# Patient Record
Sex: MALE | Race: WHITE | URBAN - METROPOLITAN AREA
[De-identification: names, ages, dates, MRNs, and addresses within clinical notes are randomized per-mention and may not be internally consistent; named-entity substitution may affect disease eponyms.]

---

## 2019-10-04 ENCOUNTER — OFFICE VISIT (OUTPATIENT)
Dept: PRIMARY CARE CLINIC | Age: 34
End: 2019-10-04
Payer: COMMERCIAL

## 2019-10-04 VITALS
SYSTOLIC BLOOD PRESSURE: 142 MMHG | HEART RATE: 77 BPM | DIASTOLIC BLOOD PRESSURE: 88 MMHG | WEIGHT: 257.8 LBS | HEIGHT: 70 IN | BODY MASS INDEX: 36.91 KG/M2 | OXYGEN SATURATION: 98 %

## 2019-10-04 DIAGNOSIS — R03.0 ELEVATED BP WITHOUT DIAGNOSIS OF HYPERTENSION: ICD-10-CM

## 2019-10-04 DIAGNOSIS — S39.012A STRAIN OF LUMBAR REGION, INITIAL ENCOUNTER: Primary | ICD-10-CM

## 2019-10-04 PROCEDURE — 99203 OFFICE O/P NEW LOW 30 MIN: CPT | Performed by: PHYSICIAN ASSISTANT

## 2019-10-04 RX ORDER — CYCLOBENZAPRINE HCL 10 MG
10 TABLET ORAL 3 TIMES DAILY PRN
Qty: 30 TABLET | Refills: 0 | Status: SHIPPED | OUTPATIENT
Start: 2019-10-04 | End: 2022-08-11

## 2019-10-04 RX ORDER — IBUPROFEN 800 MG/1
800 TABLET ORAL EVERY 8 HOURS PRN
Qty: 60 TABLET | Refills: 0 | Status: SHIPPED | OUTPATIENT
Start: 2019-10-04 | End: 2022-08-11

## 2019-10-04 ASSESSMENT — PATIENT HEALTH QUESTIONNAIRE - PHQ9
2. FEELING DOWN, DEPRESSED OR HOPELESS: 0
SUM OF ALL RESPONSES TO PHQ9 QUESTIONS 1 & 2: 0
SUM OF ALL RESPONSES TO PHQ QUESTIONS 1-9: 0
1. LITTLE INTEREST OR PLEASURE IN DOING THINGS: 0
SUM OF ALL RESPONSES TO PHQ QUESTIONS 1-9: 0

## 2019-10-11 ASSESSMENT — ENCOUNTER SYMPTOMS: BACK PAIN: 1

## 2019-10-15 ENCOUNTER — OFFICE VISIT (OUTPATIENT)
Dept: PRIMARY CARE CLINIC | Age: 34
End: 2019-10-15
Payer: COMMERCIAL

## 2019-10-15 VITALS
SYSTOLIC BLOOD PRESSURE: 136 MMHG | OXYGEN SATURATION: 98 % | DIASTOLIC BLOOD PRESSURE: 84 MMHG | BODY MASS INDEX: 37.93 KG/M2 | WEIGHT: 260.6 LBS | HEART RATE: 95 BPM

## 2019-10-15 DIAGNOSIS — M25.521 RIGHT ELBOW PAIN: ICD-10-CM

## 2019-10-15 DIAGNOSIS — E78.00 HIGH CHOLESTEROL: ICD-10-CM

## 2019-10-15 DIAGNOSIS — Z13.1 SCREENING FOR DIABETES MELLITUS (DM): ICD-10-CM

## 2019-10-15 DIAGNOSIS — M25.621 DECREASED RANGE OF MOTION OF RIGHT ELBOW: ICD-10-CM

## 2019-10-15 DIAGNOSIS — S39.012A STRAIN OF LUMBAR REGION, INITIAL ENCOUNTER: Primary | ICD-10-CM

## 2019-10-15 PROCEDURE — 99214 OFFICE O/P EST MOD 30 MIN: CPT | Performed by: PHYSICIAN ASSISTANT

## 2019-10-20 ASSESSMENT — ENCOUNTER SYMPTOMS: BACK PAIN: 0

## 2021-09-14 ENCOUNTER — OFFICE VISIT (OUTPATIENT)
Dept: PRIMARY CARE CLINIC | Age: 36
End: 2021-09-14
Payer: COMMERCIAL

## 2021-09-14 VITALS
OXYGEN SATURATION: 95 % | SYSTOLIC BLOOD PRESSURE: 138 MMHG | DIASTOLIC BLOOD PRESSURE: 82 MMHG | BODY MASS INDEX: 35.87 KG/M2 | HEART RATE: 96 BPM | WEIGHT: 246.4 LBS

## 2021-09-14 DIAGNOSIS — R51.9 CHRONIC INTRACTABLE HEADACHE, UNSPECIFIED HEADACHE TYPE: Primary | ICD-10-CM

## 2021-09-14 DIAGNOSIS — G89.29 CHRONIC INTRACTABLE HEADACHE, UNSPECIFIED HEADACHE TYPE: Primary | ICD-10-CM

## 2021-09-14 DIAGNOSIS — G47.00 INSOMNIA, UNSPECIFIED TYPE: ICD-10-CM

## 2021-09-14 PROCEDURE — 99214 OFFICE O/P EST MOD 30 MIN: CPT | Performed by: PHYSICIAN ASSISTANT

## 2021-09-14 RX ORDER — SUMATRIPTAN 50 MG/1
50 TABLET, FILM COATED ORAL
Qty: 9 TABLET | Refills: 0 | Status: SHIPPED | OUTPATIENT
Start: 2021-09-14 | End: 2022-08-23

## 2021-09-14 RX ORDER — TRAZODONE HYDROCHLORIDE 50 MG/1
50 TABLET ORAL NIGHTLY
Qty: 30 TABLET | Refills: 0 | Status: SHIPPED | OUTPATIENT
Start: 2021-09-14

## 2021-09-14 SDOH — ECONOMIC STABILITY: FOOD INSECURITY: WITHIN THE PAST 12 MONTHS, YOU WORRIED THAT YOUR FOOD WOULD RUN OUT BEFORE YOU GOT MONEY TO BUY MORE.: NEVER TRUE

## 2021-09-14 SDOH — ECONOMIC STABILITY: FOOD INSECURITY: WITHIN THE PAST 12 MONTHS, THE FOOD YOU BOUGHT JUST DIDN'T LAST AND YOU DIDN'T HAVE MONEY TO GET MORE.: NEVER TRUE

## 2021-09-14 ASSESSMENT — ENCOUNTER SYMPTOMS
RHINORRHEA: 0
CONSTIPATION: 0
VOMITING: 0
PHOTOPHOBIA: 0
DIARRHEA: 0
CHEST TIGHTNESS: 0
SINUS PRESSURE: 0
SHORTNESS OF BREATH: 0
ABDOMINAL PAIN: 0
ABDOMINAL DISTENTION: 0
EYE DISCHARGE: 0
COUGH: 0
SORE THROAT: 0

## 2021-09-14 ASSESSMENT — SOCIAL DETERMINANTS OF HEALTH (SDOH): HOW HARD IS IT FOR YOU TO PAY FOR THE VERY BASICS LIKE FOOD, HOUSING, MEDICAL CARE, AND HEATING?: NOT HARD AT ALL

## 2021-09-14 NOTE — PROGRESS NOTES
for Pain With food 60 tablet 0     No current facility-administered medications for this visit. No Known Allergies    Health Maintenance   Topic Date Due    Hepatitis C screen  Never done    Varicella vaccine (1 of 2 - 2-dose childhood series) Never done    COVID-19 Vaccine (1) Never done    HIV screen  Never done    Flu vaccine (1) Never done    DTaP/Tdap/Td vaccine (2 - Td or Tdap) 05/19/2026    Hepatitis A vaccine  Aged Out    Hepatitis B vaccine  Aged Out    Hib vaccine  Aged Out    Meningococcal (ACWY) vaccine  Aged Out    Pneumococcal 0-64 years Vaccine  Aged Out       Subjective:      Review of Systems   Constitutional: Negative for chills, fever and unexpected weight change. HENT: Negative for congestion, hearing loss, rhinorrhea, sinus pressure and sore throat. Eyes: Negative for photophobia, discharge and visual disturbance. Respiratory: Negative for cough, chest tightness and shortness of breath. Cardiovascular: Negative for chest pain, palpitations and leg swelling. Gastrointestinal: Negative for abdominal distention, abdominal pain, constipation, diarrhea and vomiting. Endocrine: Negative for polydipsia and polyuria. Genitourinary: Negative for decreased urine volume, difficulty urinating, frequency and urgency. Musculoskeletal: Negative for arthralgias, gait problem and myalgias. Skin: Negative for rash. Allergic/Immunologic: Negative for food allergies. Neurological: Positive for headaches. Negative for dizziness, weakness and numbness. Hematological: Negative for adenopathy. Psychiatric/Behavioral: Negative for dysphoric mood and sleep disturbance. The patient is not nervous/anxious. Objective:     /82   Pulse 96   Wt 246 lb 6.4 oz (111.8 kg)   SpO2 95%   BMI 35.87 kg/m²   Physical Exam  Constitutional:       General: He is not in acute distress. Appearance: Normal appearance. He is not ill-appearing.    HENT:      Head: Normocephalic and atraumatic. Right Ear: Tympanic membrane, ear canal and external ear normal.      Left Ear: Tympanic membrane, ear canal and external ear normal.      Nose: Nose normal.      Mouth/Throat:      Mouth: Mucous membranes are moist.   Eyes:      Extraocular Movements: Extraocular movements intact. Conjunctiva/sclera: Conjunctivae normal.      Pupils: Pupils are equal, round, and reactive to light. Neck:      Vascular: No carotid bruit. Cardiovascular:      Rate and Rhythm: Normal rate and regular rhythm. Pulses: Normal pulses. Heart sounds: Normal heart sounds. Pulmonary:      Effort: Pulmonary effort is normal. No respiratory distress. Breath sounds: Normal breath sounds. Abdominal:      General: Bowel sounds are normal. There is no distension. Tenderness: There is no abdominal tenderness. Musculoskeletal:         General: Normal range of motion. Cervical back: Normal range of motion and neck supple. Lymphadenopathy:      Cervical: No cervical adenopathy. Skin:     General: Skin is warm and dry. Neurological:      General: No focal deficit present. Mental Status: He is alert and oriented to person, place, and time. Psychiatric:         Mood and Affect: Mood normal.         Behavior: Behavior normal.         Thought Content: Thought content normal.         Assessment and Plan:          1. Chronic intractable headache, unspecified headache type  -     SUMAtriptan (IMITREX) 50 MG tablet; Take 1 tablet by mouth once as needed for Migraine, Disp-9 tablet, R-0Normal  2. Insomnia, unspecified type  -     traZODone (DESYREL) 50 MG tablet; Take 1 tablet by mouth nightly, Disp-30 tablet, R-0Normal             Patient given educational materials - see patient instructions. Discussed use, benefit, and side effects of prescribed medications. All patient questions answered. Pt voiced understanding. Reviewed health maintenance.   Instructed to continue current medications, diet and exercise. Patient agreed with treatment plan. Follow up as directed.      Electronically signed by GUS Taylor on 9/14/2021 at 2:45 PM

## 2022-08-11 ENCOUNTER — OFFICE VISIT (OUTPATIENT)
Dept: FAMILY MEDICINE CLINIC | Age: 37
End: 2022-08-11
Payer: COMMERCIAL

## 2022-08-11 VITALS
SYSTOLIC BLOOD PRESSURE: 126 MMHG | WEIGHT: 269 LBS | BODY MASS INDEX: 38.51 KG/M2 | OXYGEN SATURATION: 95 % | HEART RATE: 81 BPM | HEIGHT: 70 IN | DIASTOLIC BLOOD PRESSURE: 76 MMHG

## 2022-08-11 DIAGNOSIS — M25.511 ACUTE PAIN OF RIGHT SHOULDER: Primary | ICD-10-CM

## 2022-08-11 PROCEDURE — 99203 OFFICE O/P NEW LOW 30 MIN: CPT | Performed by: STUDENT IN AN ORGANIZED HEALTH CARE EDUCATION/TRAINING PROGRAM

## 2022-08-11 RX ORDER — IBUPROFEN 800 MG/1
800 TABLET ORAL 3 TIMES DAILY PRN
Qty: 30 TABLET | Refills: 1 | Status: SHIPPED | OUTPATIENT
Start: 2022-08-11

## 2022-08-11 RX ORDER — CYCLOBENZAPRINE HCL 10 MG
10 TABLET ORAL 3 TIMES DAILY PRN
Qty: 21 TABLET | Refills: 0 | Status: SHIPPED | OUTPATIENT
Start: 2022-08-11 | End: 2022-08-21

## 2022-08-11 ASSESSMENT — ENCOUNTER SYMPTOMS
ABDOMINAL DISTENTION: 0
SORE THROAT: 0
SHORTNESS OF BREATH: 0
ABDOMINAL PAIN: 0
CHEST TIGHTNESS: 0

## 2022-08-11 ASSESSMENT — PATIENT HEALTH QUESTIONNAIRE - PHQ9
SUM OF ALL RESPONSES TO PHQ QUESTIONS 1-9: 0
2. FEELING DOWN, DEPRESSED OR HOPELESS: 0
1. LITTLE INTEREST OR PLEASURE IN DOING THINGS: 0
SUM OF ALL RESPONSES TO PHQ QUESTIONS 1-9: 0
SUM OF ALL RESPONSES TO PHQ9 QUESTIONS 1 & 2: 0
SUM OF ALL RESPONSES TO PHQ QUESTIONS 1-9: 0
SUM OF ALL RESPONSES TO PHQ QUESTIONS 1-9: 0

## 2022-08-11 NOTE — PROGRESS NOTES
[address]  DR. KANDICE WOLF  [address], [address]  [facility]      Date of Visit:  2022  Patient Name: Raimundo Farrell   Patient :  1985     CHIEF COMPLAINT:     Raimundo Farrell is a 43 y.o. male who presents today for an general visit to be evaluated for the following condition(s):  Chief Complaint   Patient presents with    Establish Care           Neck Pain     Right shoulder/ neck pain. Started Monday night. Better        REVIEW OF SYSTEM      Review of Systems   Constitutional:  Negative for chills and fever. HENT:  Negative for congestion, postnasal drip and sore throat. Respiratory:  Negative for chest tightness and shortness of breath. Cardiovascular:  Negative for chest pain. Gastrointestinal:  Negative for abdominal distention and abdominal pain. Genitourinary:  Negative for difficulty urinating. Musculoskeletal:         Right shoulder pain      HISTORY OF PRESENT ILLNESS     36M here for right shoulder pain. 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 Saw chiropractor yesterday and felt significantly better after adjustment. Woke up at 3am this morning with excrutiating pain. Had to take time off work for the first time. Localized to right shoulder. Intermittent, throbbing. No radiation. No radicular symptoms. No numbness or tingling. Has taken ibuprofen without any relief. His right leg started cramping up. REVIEWED INFORMATION      No Known Allergies    There is no problem list on file for this patient.       Past Medical History:   Diagnosis Date    Chronic infection of both ears        Past Surgical History:   Procedure Laterality Date    APPENDECTOMY      TYMPANOSTOMY TUBE PLACEMENT      WISDOM TOOTH EXTRACTION          Social History     Socioeconomic History    Marital status:      Spouse name: None    Number of children: None    Years of education: None Highest education level: None   Tobacco Use    Smoking status: Never    Smokeless tobacco: Never   Vaping Use    Vaping Use: Never used   Substance and Sexual Activity    Alcohol use: Yes     Alcohol/week: 12.0 standard drinks     Types: 12 Cans of beer per week     Comment: weekly    Drug use: Never    Sexual activity: Yes     Partners: Female     Social Determinants of Health     Financial Resource Strain: Low Risk     Difficulty of Paying Living Expenses: Not hard at all   Food Insecurity: No Food Insecurity    Worried About Running Out of Food in the Last Year: Never true    Ran Out of Food in the Last Year: Never true        Family History   Problem Relation Age of Onset    Heart Disease Father     High Blood Pressure Father     Heart Attack Father 64    Heart Disease Paternal Grandfather     Heart Attack Paternal Grandfather        PHYSICAL EXAM     /76   Pulse 81   Ht 5' 9.5\" (1.765 m)   Wt 269 lb (122 kg)   SpO2 95%   BMI 39.15 kg/m²    Physical Exam  Musculoskeletal:      Comments: Limited abduction and flexion to 90 degrees. Negative empty soda can. Pain with external rotation. Pain along right trapezius to palpation. Limited left lateral bending and left rotation of neck. ASSESSMENT/PLAN     1. Acute pain of right shoulder  Suspect MSK rotator cuff/trap strain. Will start flexeril/ibuprofen for pain relief. PT referral placed. F/u in 1 month, consider soft tissue imaging at that time if not improving. Work restriction note provided  - cyclobenzaprine (FLEXERIL) 10 MG tablet; Take 1 tablet by mouth 3 times daily as needed for Muscle spasms  Dispense: 21 tablet; Refill: 0  - ibuprofen (ADVIL;MOTRIN) 800 MG tablet; Take 1 tablet by mouth 3 times daily as needed for Pain  Dispense: 30 tablet; Refill: 1  - [address]    Return in about 1 month (around 9/11/2022) for right shoulder pain .     COMMUNICATION:       Electronically signed by Tano Pérez MD on 8/11/2022 at 2:50 PM

## 2022-08-12 ENCOUNTER — TELEPHONE (OUTPATIENT)
Dept: FAMILY MEDICINE CLINIC | Age: 37
End: 2022-08-12

## 2022-08-12 NOTE — TELEPHONE ENCOUNTER
Pt dropped off [facility] paperwork that needs filled out and and faxed to employer.  Paperwork is at the  in [facility]

## 2022-08-12 NOTE — TELEPHONE ENCOUNTER
FMLA forms completed: Scanned to chart. No fax number was on paperwork. I did lvm for patient that paperwork was ready to be picked up in chart and to call the office with any questions.

## 2022-08-18 ENCOUNTER — TELEPHONE (OUTPATIENT)
Dept: FAMILY MEDICINE CLINIC | Age: 37
End: 2022-08-18

## 2022-08-18 NOTE — TELEPHONE ENCOUNTER
Patient called stating he needs this note written today and he can come pick it up. Patient states his work is contacting him, and they need the note to excuse him for work tomorrow until Tuesday. Patient states he did make the appointment for next Tuesday at 8:30 am to come see Dr. Angelica Almaraz for this issue. Patient would like a phone call once this letter is written. Please Advise.

## 2022-08-18 NOTE — TELEPHONE ENCOUNTER
Patient states he is still not feeling better and believes he wont be able to work next week especially after completing PT yesterday. Patient states PT believes he has a pinched nerve along with agitated muscles. Patient states he keeps waking up with his right arm numb and is in pain. 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 Patient states he has 90 degree range to move his head to the right and 140 degree range to move it to the left. Patient states he would just like his leave extended. Patient states he works tomorrow and needs the note today, or he will have to go in tomorrow. Please Advise.

## 2022-08-18 NOTE — TELEPHONE ENCOUNTER
OK to extend the note until next Tuesday 8/23, but make him an appt with Dr. Hussein Richards for that day to re-evaluate and further off work note. Thanks.

## 2022-08-22 NOTE — PROGRESS NOTES
[address]  [address] KANDICE WOLF  [address]  [place]      Date of Visit:  2022  Patient Name: Cindy Fitch   Patient :  1985     CHIEF COMPLAINT:     Cindy Fitch is a 43 y.o. male who presents today for an general visit to be evaluated for the following condition(s):  Chief Complaint   Patient presents with    Shoulder Pain     PT is helping. REVIEW OF SYSTEM      Review of Systems   Constitutional:  Negative for chills and fever. HENT:  Negative for congestion, postnasal drip and sore throat. Respiratory:  Negative for chest tightness and shortness of breath. Cardiovascular:  Negative for chest pain. Gastrointestinal:  Negative for abdominal distention and abdominal pain. Genitourinary:  Negative for difficulty urinating. Musculoskeletal:         Right shoulder pain      HISTORY OF PRESENT ILLNESS     36M right shoulder pain here for follow up. Patient states he has had 50% improvement of his shoulder pain since completing 3 sessions of PT. He is taking the ibuprofen as well. He is requesting an extension of his work excuse until . REVIEWED INFORMATION      No Known Allergies    There is no problem list on file for this patient. Past Medical History:   Diagnosis Date    Chronic infection of both ears        Past Surgical History:   Procedure Laterality Date    APPENDECTOMY      TYMPANOSTOMY TUBE PLACEMENT      WISDOM TOOTH EXTRACTION          Social History     Socioeconomic History    Marital status:      Spouse name: None    Number of children: None    Years of education: None    Highest education level: None   Tobacco Use    Smoking status: Never    Smokeless tobacco: Never   Vaping Use    Vaping Use: Never used   Substance and Sexual Activity    Alcohol use:  Yes     Alcohol/week: 12.0 standard drinks     Types: 12 Cans of beer per week     Comment: weekly    Drug use: Never    Sexual activity: Yes     Partners: Female     Social Determinants of Health     Financial Resource Strain: Low Risk     Difficulty of Paying Living Expenses: Not hard at all   Food Insecurity: No Food Insecurity    Worried About Running Out of Food in the Last Year: Never true    Ran Out of Food in the Last Year: Never true        Family History   Problem Relation Age of Onset    Heart Disease Father     High Blood Pressure Father     Heart Attack Father 64    Heart Disease Paternal Grandfather     Heart Attack Paternal Grandfather        PHYSICAL EXAM     BP (!) 148/101   Pulse 82   Ht 5' 9.5\" (1.765 m)   Wt 269 lb (122 kg)   BMI 39.15 kg/m²    Physical Exam  Constitutional:       Appearance: Normal appearance. HENT:      Head: Atraumatic. Cardiovascular:      Rate and Rhythm: Normal rate and regular rhythm. Pulses: Normal pulses. Heart sounds: Normal heart sounds. No murmur heard. No friction rub. No gallop. Pulmonary:      Effort: Pulmonary effort is normal. No respiratory distress. Breath sounds: Normal breath sounds. Neurological:      Mental Status: He is alert. Psychiatric:         Mood and Affect: Mood normal.       ASSESSMENT/PLAN     1. Acute pain of right shoulder  Improving with PT, possibly partial rotator cuff tear, will get XR for initial eval. F/u in 1 month to see if MRI is necessary. Continue NSAIDs prn and ongoing PT in the interim. Work excuse extended until 8/30.   - XR SHOULDER RIGHT (MIN 2 VIEWS); Future    2. Elevated blood pressure reading  Possibly due to underlying shoulder pain, will recheck in 1 month and consider starting anti-HTN medication if not improving. Return in about 4 weeks (around 9/20/2022).     COMMUNICATION:       Electronically signed by Leonel Whitten MD on 8/23/2022 at 9:09 AM

## 2022-08-23 ENCOUNTER — HOSPITAL ENCOUNTER (OUTPATIENT)
Dept: GENERAL RADIOLOGY | Age: 37
Discharge: HOME | End: 2022-08-25
Payer: COMMERCIAL

## 2022-08-23 ENCOUNTER — HOSPITAL ENCOUNTER (OUTPATIENT)
Age: 37
Discharge: HOME | End: 2022-08-25
Payer: COMMERCIAL

## 2022-08-23 ENCOUNTER — OFFICE VISIT (OUTPATIENT)
Dept: FAMILY MEDICINE CLINIC | Age: 37
End: 2022-08-23
Payer: COMMERCIAL

## 2022-08-23 VITALS
DIASTOLIC BLOOD PRESSURE: 101 MMHG | WEIGHT: 269 LBS | SYSTOLIC BLOOD PRESSURE: 148 MMHG | HEART RATE: 82 BPM | HEIGHT: 70 IN | BODY MASS INDEX: 38.51 KG/M2

## 2022-08-23 DIAGNOSIS — M25.511 ACUTE PAIN OF RIGHT SHOULDER: ICD-10-CM

## 2022-08-23 DIAGNOSIS — M25.511 ACUTE PAIN OF RIGHT SHOULDER: Primary | ICD-10-CM

## 2022-08-23 DIAGNOSIS — R03.0 ELEVATED BLOOD PRESSURE READING: ICD-10-CM

## 2022-08-23 PROCEDURE — 73030 X-RAY EXAM OF SHOULDER: CPT

## 2022-08-23 PROCEDURE — 99213 OFFICE O/P EST LOW 20 MIN: CPT | Performed by: STUDENT IN AN ORGANIZED HEALTH CARE EDUCATION/TRAINING PROGRAM

## 2022-08-23 ASSESSMENT — ENCOUNTER SYMPTOMS
ABDOMINAL PAIN: 0
CHEST TIGHTNESS: 0
ABDOMINAL DISTENTION: 0
SORE THROAT: 0
SHORTNESS OF BREATH: 0

## 2022-08-23 ASSESSMENT — PATIENT HEALTH QUESTIONNAIRE - PHQ9
SUM OF ALL RESPONSES TO PHQ QUESTIONS 1-9: 0
SUM OF ALL RESPONSES TO PHQ9 QUESTIONS 1 & 2: 0
2. FEELING DOWN, DEPRESSED OR HOPELESS: 0
SUM OF ALL RESPONSES TO PHQ QUESTIONS 1-9: 0
1. LITTLE INTEREST OR PLEASURE IN DOING THINGS: 0
SUM OF ALL RESPONSES TO PHQ QUESTIONS 1-9: 0
SUM OF ALL RESPONSES TO PHQ QUESTIONS 1-9: 0

## 2022-08-23 NOTE — PATIENT INSTRUCTIONS
The medication list included in this document is our record of what you are currently taking, including any changes that were made at today's visit. If you find any differences when compared to your medications at home, or have any questions that were not answered at your visit, please contact the office.     Voltaren gel

## 2022-08-29 ENCOUNTER — TELEPHONE (OUTPATIENT)
Dept: FAMILY MEDICINE CLINIC | Age: 37
End: 2022-08-29

## 2023-12-12 ENCOUNTER — OFFICE VISIT (OUTPATIENT)
Dept: FAMILY MEDICINE CLINIC | Age: 38
End: 2023-12-12
Payer: COMMERCIAL

## 2023-12-12 VITALS
SYSTOLIC BLOOD PRESSURE: 148 MMHG | HEIGHT: 70 IN | BODY MASS INDEX: 40.03 KG/M2 | OXYGEN SATURATION: 95 % | DIASTOLIC BLOOD PRESSURE: 102 MMHG | WEIGHT: 279.6 LBS | HEART RATE: 89 BPM | TEMPERATURE: 98.2 F

## 2023-12-12 DIAGNOSIS — I10 PRIMARY HYPERTENSION: ICD-10-CM

## 2023-12-12 DIAGNOSIS — R63.5 WEIGHT GAIN: Primary | ICD-10-CM

## 2023-12-12 DIAGNOSIS — Z13.1 SCREENING FOR DIABETES MELLITUS: ICD-10-CM

## 2023-12-12 DIAGNOSIS — J01.90 ACUTE NON-RECURRENT SINUSITIS, UNSPECIFIED LOCATION: ICD-10-CM

## 2023-12-12 DIAGNOSIS — Z11.4 SCREENING FOR HIV (HUMAN IMMUNODEFICIENCY VIRUS): ICD-10-CM

## 2023-12-12 DIAGNOSIS — Z11.59 ENCOUNTER FOR HEPATITIS C SCREENING TEST FOR LOW RISK PATIENT: ICD-10-CM

## 2023-12-12 DIAGNOSIS — Z13.220 SCREENING FOR LIPID DISORDERS: ICD-10-CM

## 2023-12-12 PROCEDURE — 99214 OFFICE O/P EST MOD 30 MIN: CPT | Performed by: STUDENT IN AN ORGANIZED HEALTH CARE EDUCATION/TRAINING PROGRAM

## 2023-12-12 PROCEDURE — 3080F DIAST BP >= 90 MM HG: CPT | Performed by: STUDENT IN AN ORGANIZED HEALTH CARE EDUCATION/TRAINING PROGRAM

## 2023-12-12 PROCEDURE — 3077F SYST BP >= 140 MM HG: CPT | Performed by: STUDENT IN AN ORGANIZED HEALTH CARE EDUCATION/TRAINING PROGRAM

## 2023-12-12 RX ORDER — PREDNISONE 20 MG/1
20 TABLET ORAL 2 TIMES DAILY
Qty: 10 TABLET | Refills: 0 | Status: SHIPPED | OUTPATIENT
Start: 2023-12-12 | End: 2023-12-17

## 2023-12-12 RX ORDER — AMOXICILLIN AND CLAVULANATE POTASSIUM 875; 125 MG/1; MG/1
1 TABLET, FILM COATED ORAL 2 TIMES DAILY
Qty: 14 TABLET | Refills: 0 | Status: SHIPPED | OUTPATIENT
Start: 2023-12-12 | End: 2023-12-19

## 2023-12-12 RX ORDER — AMLODIPINE BESYLATE 5 MG/1
5 TABLET ORAL DAILY
Qty: 30 TABLET | Refills: 0 | Status: SHIPPED | OUTPATIENT
Start: 2023-12-12

## 2023-12-12 SDOH — ECONOMIC STABILITY: FOOD INSECURITY: WITHIN THE PAST 12 MONTHS, THE FOOD YOU BOUGHT JUST DIDN'T LAST AND YOU DIDN'T HAVE MONEY TO GET MORE.: NEVER TRUE

## 2023-12-12 SDOH — ECONOMIC STABILITY: FOOD INSECURITY: WITHIN THE PAST 12 MONTHS, YOU WORRIED THAT YOUR FOOD WOULD RUN OUT BEFORE YOU GOT MONEY TO BUY MORE.: NEVER TRUE

## 2023-12-12 SDOH — ECONOMIC STABILITY: HOUSING INSECURITY
IN THE LAST 12 MONTHS, WAS THERE A TIME WHEN YOU DID NOT HAVE A STEADY PLACE TO SLEEP OR SLEPT IN A SHELTER (INCLUDING NOW)?: NO

## 2023-12-12 SDOH — ECONOMIC STABILITY: INCOME INSECURITY: HOW HARD IS IT FOR YOU TO PAY FOR THE VERY BASICS LIKE FOOD, HOUSING, MEDICAL CARE, AND HEATING?: NOT HARD AT ALL

## 2023-12-12 ASSESSMENT — ENCOUNTER SYMPTOMS
COUGH: 1
CHEST TIGHTNESS: 0
ABDOMINAL DISTENTION: 0
ABDOMINAL PAIN: 0
SORE THROAT: 1
SHORTNESS OF BREATH: 0

## 2023-12-12 ASSESSMENT — PATIENT HEALTH QUESTIONNAIRE - PHQ9
SUM OF ALL RESPONSES TO PHQ QUESTIONS 1-9: 0
2. FEELING DOWN, DEPRESSED OR HOPELESS: 0
SUM OF ALL RESPONSES TO PHQ QUESTIONS 1-9: 0
1. LITTLE INTEREST OR PLEASURE IN DOING THINGS: 0
SUM OF ALL RESPONSES TO PHQ9 QUESTIONS 1 & 2: 0
SUM OF ALL RESPONSES TO PHQ QUESTIONS 1-9: 0
SUM OF ALL RESPONSES TO PHQ QUESTIONS 1-9: 0

## 2023-12-12 NOTE — PROGRESS NOTES
[address] [facility]  DR. KANDICE WOLF  [facility], [address] [facility], [address] [facility] [address]      Date of Visit:  2023  Patient Name: Lisa Hill   Patient :  1985     CHIEF COMPLAINT:     Lisa Hill is a 34 y.o. male who presents today for an general visit to be evaluated for the following condition(s):  Chief Complaint   Patient presents with    Congestion     Sinus- completely clogged- using OTC nasal sprays, mucinex 12 hr         REVIEW OF SYSTEM      Review of Systems   Constitutional:  Negative for chills and fever. HENT:  Positive for congestion and sore throat. Negative for postnasal drip. Respiratory:  Positive for cough. Negative for chest tightness and shortness of breath. Cardiovascular:  Negative for chest pain. Gastrointestinal:  Negative for abdominal distention and abdominal pain. Genitourinary:  Negative for difficulty urinating. HISTORY OF PRESENT ILLNESS     38M right shoulder pain here for follow up. Having right sided nasal congestion for the last 2 months. Having bloody nose every time he blows his nose. Intermittent ear congestion with popping. No ear discharge, fever, chills. Having sore throat but no trickle in the back of the throat. Has cough that is dry. No SOB or wheezing. Has taken mucinex and flonase on and off for a couple of months without relief. His blood pressure remains elevated from last visit. He is OK with starting BP medication today. ----    Patient states he has had 50% improvement of his shoulder pain since completing 3 sessions of PT. He is taking the ibuprofen as well. He is requesting an extension of his work excuse until .     REVIEWED INFORMATION      No Known Allergies    Patient Active Problem List   Diagnosis    Primary hypertension       Past Medical History:   Diagnosis Date    Chronic infection of both ears        Past Surgical History:   Procedure Laterality Date

## 2023-12-13 ENCOUNTER — HOSPITAL ENCOUNTER (OUTPATIENT)
Age: 38
Setting detail: SPECIMEN
Discharge: HOME | End: 2023-12-13

## 2023-12-13 DIAGNOSIS — Z13.1 SCREENING FOR DIABETES MELLITUS: ICD-10-CM

## 2023-12-13 DIAGNOSIS — Z11.4 SCREENING FOR HIV (HUMAN IMMUNODEFICIENCY VIRUS): ICD-10-CM

## 2023-12-13 DIAGNOSIS — R63.5 WEIGHT GAIN: ICD-10-CM

## 2023-12-13 DIAGNOSIS — Z11.59 ENCOUNTER FOR HEPATITIS C SCREENING TEST FOR LOW RISK PATIENT: ICD-10-CM

## 2023-12-13 DIAGNOSIS — Z13.220 SCREENING FOR LIPID DISORDERS: ICD-10-CM

## 2023-12-13 LAB
ALBUMIN SERPL-MCNC: 4.5 G/DL (ref 3.5–5.2)
ALBUMIN/GLOB SERPL: 1.3 {RATIO} (ref 1–2.5)
ALP SERPL-CCNC: 67 U/L (ref 40–129)
ALT SERPL-CCNC: 38 U/L (ref 5–41)
ANION GAP SERPL CALCULATED.3IONS-SCNC: 12 MMOL/L (ref 9–17)
AST SERPL-CCNC: 24 U/L
BILIRUB SERPL-MCNC: 0.3 MG/DL (ref 0.3–1.2)
BUN SERPL-MCNC: 13 MG/DL (ref 6–20)
CALCIUM SERPL-MCNC: 9.4 MG/DL (ref 8.6–10.4)
CHLORIDE SERPL-SCNC: 103 MMOL/L (ref 98–107)
CHOLEST SERPL-MCNC: 189 MG/DL
CHOLESTEROL/HDL RATIO: 5.7
CO2 SERPL-SCNC: 25 MMOL/L (ref 20–31)
CREAT SERPL-MCNC: 0.9 MG/DL (ref 0.7–1.2)
ERYTHROCYTE [DISTWIDTH] IN BLOOD BY AUTOMATED COUNT: 12.9 % (ref 11.8–14.4)
EST. AVERAGE GLUCOSE BLD GHB EST-MCNC: 128 MG/DL
GFR SERPL CREATININE-BSD FRML MDRD: >60 ML/MIN/1.73M2
GLUCOSE SERPL-MCNC: 85 MG/DL (ref 70–99)
HBA1C MFR BLD: 6.1 % (ref 4–6)
HCT VFR BLD AUTO: 49 % (ref 40.7–50.3)
HDLC SERPL-MCNC: 33 MG/DL
HGB BLD-MCNC: 16.5 G/DL (ref 13–17)
LDLC SERPL CALC-MCNC: 144 MG/DL (ref 0–130)
MCH RBC QN AUTO: 30.7 PG (ref 25.2–33.5)
MCHC RBC AUTO-ENTMCNC: 33.7 G/DL (ref 28.4–34.8)
MCV RBC AUTO: 91.2 FL (ref 82.6–102.9)
NRBC BLD-RTO: 0 PER 100 WBC
PLATELET # BLD AUTO: 312 K/UL (ref 138–453)
PMV BLD AUTO: 9.5 FL (ref 8.1–13.5)
POTASSIUM SERPL-SCNC: 4 MMOL/L (ref 3.7–5.3)
PROT SERPL-MCNC: 7.9 G/DL (ref 6.4–8.3)
RBC # BLD AUTO: 5.37 M/UL (ref 4.21–5.77)
SODIUM SERPL-SCNC: 140 MMOL/L (ref 135–144)
T4 FREE SERPL-MCNC: 1.1 NG/DL (ref 0.9–1.7)
TRIGL SERPL-MCNC: 60 MG/DL
TSH SERPL DL<=0.05 MIU/L-ACNC: 2.64 UIU/ML (ref 0.3–5)
WBC OTHER # BLD: 9.8 K/UL (ref 3.5–11.3)

## 2023-12-14 DIAGNOSIS — E66.01 MORBID OBESITY (HCC): ICD-10-CM

## 2023-12-14 DIAGNOSIS — E78.5 DYSLIPIDEMIA: Primary | ICD-10-CM

## 2023-12-14 DIAGNOSIS — R73.09 ELEVATED HEMOGLOBIN A1C: ICD-10-CM

## 2023-12-14 LAB
HCV AB SERPL QL IA: NONREACTIVE
HIV 1+2 AB+HIV1 P24 AG SERPL QL IA: NONREACTIVE

## 2024-01-03 ASSESSMENT — ENCOUNTER SYMPTOMS
SHORTNESS OF BREATH: 0
CHEST TIGHTNESS: 0
SORE THROAT: 1
ABDOMINAL DISTENTION: 0
ABDOMINAL PAIN: 0

## 2024-01-03 NOTE — PROGRESS NOTES
Problem List   Diagnosis    Primary hypertension    Morbid obesity (HCC)    Elevated hemoglobin A1c    Dyslipidemia    Chronic sinusitis       Past Medical History:   Diagnosis Date    Chronic infection of both ears        Past Surgical History:   Procedure Laterality Date    APPENDECTOMY      TYMPANOSTOMY TUBE PLACEMENT      WISDOM TOOTH EXTRACTION          Social History     Socioeconomic History    Marital status:      Spouse name: None    Number of children: None    Years of education: None    Highest education level: None   Tobacco Use    Smoking status: Never    Smokeless tobacco: Never   Vaping Use    Vaping Use: Never used   Substance and Sexual Activity    Alcohol use: Yes     Alcohol/week: 12.0 standard drinks of alcohol     Types: 12 Cans of beer per week     Comment: weekly    Drug use: Never    Sexual activity: Yes     Partners: Female     Social Determinants of Health     Financial Resource Strain: Low Risk  (12/12/2023)    Overall Financial Resource Strain (CARDIA)     Difficulty of Paying Living Expenses: Not hard at all   Food Insecurity: No Food Insecurity (12/12/2023)    Hunger Vital Sign     Worried About Running Out of Food in the Last Year: Never true     Ran Out of Food in the Last Year: Never true   Transportation Needs: Unknown (12/12/2023)    PRAPARE - Transportation     Lack of Transportation (Non-Medical): No   Housing Stability: Unknown (12/12/2023)    Housing Stability Vital Sign     Unstable Housing in the Last Year: No        Family History   Problem Relation Age of Onset    Heart Disease Father     High Blood Pressure Father     Heart Attack Father 56    Heart Disease Paternal Grandfather     Heart Attack Paternal Grandfather        PHYSICAL EXAM     BP (!) 155/104   Pulse 86   Temp 98.2 °F (36.8 °C) (Temporal)   Ht 1.765 m (5' 9.5\")   Wt 129.8 kg (286 lb 3.2 oz) Comment: pt had steel toe boots on  SpO2 94%   BMI 41.66 kg/m²    Physical Exam  Constitutional:

## 2024-01-04 ENCOUNTER — OFFICE VISIT (OUTPATIENT)
Dept: FAMILY MEDICINE CLINIC | Age: 39
End: 2024-01-04
Payer: COMMERCIAL

## 2024-01-04 VITALS
HEIGHT: 70 IN | BODY MASS INDEX: 40.97 KG/M2 | OXYGEN SATURATION: 94 % | HEART RATE: 86 BPM | SYSTOLIC BLOOD PRESSURE: 155 MMHG | WEIGHT: 286.2 LBS | DIASTOLIC BLOOD PRESSURE: 104 MMHG | TEMPERATURE: 98.2 F

## 2024-01-04 DIAGNOSIS — R73.09 ELEVATED HEMOGLOBIN A1C: ICD-10-CM

## 2024-01-04 DIAGNOSIS — I10 PRIMARY HYPERTENSION: ICD-10-CM

## 2024-01-04 DIAGNOSIS — E78.5 DYSLIPIDEMIA: ICD-10-CM

## 2024-01-04 DIAGNOSIS — J32.9 CHRONIC SINUSITIS, UNSPECIFIED LOCATION: Primary | ICD-10-CM

## 2024-01-04 PROCEDURE — 3080F DIAST BP >= 90 MM HG: CPT | Performed by: STUDENT IN AN ORGANIZED HEALTH CARE EDUCATION/TRAINING PROGRAM

## 2024-01-04 PROCEDURE — 99214 OFFICE O/P EST MOD 30 MIN: CPT | Performed by: STUDENT IN AN ORGANIZED HEALTH CARE EDUCATION/TRAINING PROGRAM

## 2024-01-04 PROCEDURE — 3077F SYST BP >= 140 MM HG: CPT | Performed by: STUDENT IN AN ORGANIZED HEALTH CARE EDUCATION/TRAINING PROGRAM

## 2024-01-04 RX ORDER — AMLODIPINE BESYLATE 10 MG/1
10 TABLET ORAL DAILY
Qty: 30 TABLET | Refills: 2 | Status: SHIPPED | OUTPATIENT
Start: 2024-01-04

## 2024-01-04 RX ORDER — PREDNISONE 20 MG/1
20 TABLET ORAL 2 TIMES DAILY
Qty: 10 TABLET | Refills: 0 | Status: SHIPPED | OUTPATIENT
Start: 2024-01-04 | End: 2024-01-09

## 2024-01-04 ASSESSMENT — PATIENT HEALTH QUESTIONNAIRE - PHQ9
SUM OF ALL RESPONSES TO PHQ9 QUESTIONS 1 & 2: 0
SUM OF ALL RESPONSES TO PHQ QUESTIONS 1-9: 0
SUM OF ALL RESPONSES TO PHQ QUESTIONS 1-9: 0
1. LITTLE INTEREST OR PLEASURE IN DOING THINGS: 0
SUM OF ALL RESPONSES TO PHQ QUESTIONS 1-9: 0
SUM OF ALL RESPONSES TO PHQ QUESTIONS 1-9: 0
2. FEELING DOWN, DEPRESSED OR HOPELESS: 0

## 2024-01-04 ASSESSMENT — ENCOUNTER SYMPTOMS: COUGH: 0

## 2024-01-04 NOTE — PATIENT INSTRUCTIONS
Patient Education        Learning About the Mediterranean Diet  What is the Mediterranean diet?     The Mediterranean diet is a style of eating rather than a diet plan. It features foods eaten in [place], [place], [place], and other countries along the [place] Sea. It emphasizes eating foods like fish, fruits, vegetables, beans, high-fiber breads and whole grains, nuts, and olive oil. This style of eating includes limited red meat, cheese, and sweets.  Why choose the Mediterranean diet?  A Mediterranean-style diet may improve heart health. It contains more fat than other heart-healthy diets. But the fats are mainly from nuts, unsaturated oils (such as fish oils and olive oil), and certain nut or seed oils (such as canola, soybean, or flaxseed oil). These fats may help protect the heart and blood vessels.  How can you get started on the Mediterranean diet?  Here are some things you can do to switch to a more Mediterranean way of eating.  What to eat  Eat a variety of fruits and vegetables each day, such as grapes, blueberries, tomatoes, broccoli, peppers, figs, olives, spinach, eggplant, beans, lentils, and chickpeas.  Eat a variety of whole-grain foods each day, such as oats, brown rice, and whole wheat bread, pasta, and couscous.  Eat fish at least 2 times a week. Try tuna, salmon, mackerel, lake trout, herring, or sardines.  Eat moderate amounts of low-fat dairy products, such as milk, cheese, or yogurt.  Eat moderate amounts of poultry and eggs.  Choose healthy (unsaturated) fats, such as nuts, olive oil, and certain nut or seed oils like canola, soybean, and flaxseed.  Limit unhealthy (saturated) fats, such as butter, palm oil, and coconut oil. And limit fats found in animal products, such as meat and dairy products made with whole milk. Try to eat red meat only a few times a month in very small amounts.  Limit sweets and desserts to only a few times a week. This includes sugar-sweetened

## 2024-04-08 ENCOUNTER — OFFICE VISIT (OUTPATIENT)
Dept: FAMILY MEDICINE CLINIC | Age: 39
End: 2024-04-08
Payer: COMMERCIAL

## 2024-04-08 ENCOUNTER — HOSPITAL ENCOUNTER (OUTPATIENT)
Age: 39
Discharge: HOME | End: 2024-04-08
Payer: COMMERCIAL

## 2024-04-08 VITALS
OXYGEN SATURATION: 96 % | HEIGHT: 70 IN | HEART RATE: 93 BPM | TEMPERATURE: 98.4 F | WEIGHT: 286.3 LBS | DIASTOLIC BLOOD PRESSURE: 113 MMHG | BODY MASS INDEX: 40.99 KG/M2 | SYSTOLIC BLOOD PRESSURE: 173 MMHG | RESPIRATION RATE: 16 BRPM

## 2024-04-08 DIAGNOSIS — R23.3 PETECHIAE: Primary | ICD-10-CM

## 2024-04-08 DIAGNOSIS — R23.3 PETECHIAE: ICD-10-CM

## 2024-04-08 DIAGNOSIS — I10 PRIMARY HYPERTENSION: ICD-10-CM

## 2024-04-08 LAB
BASOPHILS # BLD: 0.09 K/UL (ref 0–0.2)
BASOPHILS NFR BLD: 1 % (ref 0–2)
CLOSURE TME COLL+ADP BLD: 110 SEC (ref 67–112)
COLLAGEN EPINEPHRINE TIME: 170 SEC (ref 85–172)
EOSINOPHIL # BLD: 0.17 K/UL (ref 0–0.44)
EOSINOPHILS RELATIVE PERCENT: 2 % (ref 1–4)
ERYTHROCYTE [DISTWIDTH] IN BLOOD BY AUTOMATED COUNT: 13.4 % (ref 11.8–14.4)
HCT VFR BLD AUTO: 48.5 % (ref 40.7–50.3)
HGB BLD-MCNC: 16.6 G/DL (ref 13–17)
IMM GRANULOCYTES # BLD AUTO: 0.03 K/UL (ref 0–0.3)
IMM GRANULOCYTES NFR BLD: 0 %
INR PPP: 0.9
LYMPHOCYTES NFR BLD: 2.52 K/UL (ref 1.1–3.7)
LYMPHOCYTES RELATIVE PERCENT: 30 % (ref 24–43)
MCH RBC QN AUTO: 31.3 PG (ref 25.2–33.5)
MCHC RBC AUTO-ENTMCNC: 34.2 G/DL (ref 28.4–34.8)
MCV RBC AUTO: 91.3 FL (ref 82.6–102.9)
MONOCYTES NFR BLD: 0.69 K/UL (ref 0.1–1.2)
MONOCYTES NFR BLD: 8 % (ref 3–12)
NEUTROPHILS NFR BLD: 59 % (ref 36–65)
NEUTS SEG NFR BLD: 4.89 K/UL (ref 1.5–8.1)
NRBC BLD-RTO: 0 PER 100 WBC
PARTIAL THROMBOPLASTIN TIME: 23.4 SEC (ref 21.3–31.3)
PLATELET # BLD AUTO: 280 K/UL (ref 138–453)
PLATELET FUNCTION INTERP: NORMAL
PMV BLD AUTO: 10.1 FL (ref 8.1–13.5)
PROTHROMBIN TIME: 9.6 SEC (ref 9.4–12.6)
RBC # BLD AUTO: 5.31 M/UL (ref 4.21–5.77)
WBC OTHER # BLD: 8.4 K/UL (ref 3.5–11.3)

## 2024-04-08 PROCEDURE — 85245 CLOT FACTOR VIII VW RISTOCTN: CPT

## 2024-04-08 PROCEDURE — 85730 THROMBOPLASTIN TIME PARTIAL: CPT

## 2024-04-08 PROCEDURE — 99213 OFFICE O/P EST LOW 20 MIN: CPT | Performed by: STUDENT IN AN ORGANIZED HEALTH CARE EDUCATION/TRAINING PROGRAM

## 2024-04-08 PROCEDURE — 3080F DIAST BP >= 90 MM HG: CPT | Performed by: STUDENT IN AN ORGANIZED HEALTH CARE EDUCATION/TRAINING PROGRAM

## 2024-04-08 PROCEDURE — 85220 BLOOC CLOT FACTOR V TEST: CPT

## 2024-04-08 PROCEDURE — 85025 COMPLETE CBC W/AUTO DIFF WBC: CPT

## 2024-04-08 PROCEDURE — 3077F SYST BP >= 140 MM HG: CPT | Performed by: STUDENT IN AN ORGANIZED HEALTH CARE EDUCATION/TRAINING PROGRAM

## 2024-04-08 PROCEDURE — 85246 CLOT FACTOR VIII VW ANTIGEN: CPT

## 2024-04-08 PROCEDURE — 85576 BLOOD PLATELET AGGREGATION: CPT

## 2024-04-08 PROCEDURE — 85610 PROTHROMBIN TIME: CPT

## 2024-04-08 PROCEDURE — 36415 COLL VENOUS BLD VENIPUNCTURE: CPT

## 2024-04-08 RX ORDER — AMLODIPINE BESYLATE 10 MG/1
10 TABLET ORAL DAILY
Qty: 30 TABLET | Refills: 2 | Status: SHIPPED | OUTPATIENT
Start: 2024-04-08

## 2024-04-08 ASSESSMENT — ENCOUNTER SYMPTOMS
CHEST TIGHTNESS: 0
ABDOMINAL DISTENTION: 0
ABDOMINAL PAIN: 0
SHORTNESS OF BREATH: 0
SORE THROAT: 0

## 2024-04-08 NOTE — PROGRESS NOTES
[facility]  [facility]  DR. KANDICE WOLF  [address] [facility] [facility], [address]  [place] [id number]      Date of Visit:  2024  Patient Name: Berny Hoyos   Patient :  1985     CHIEF COMPLAINT:     Berny Hoyos is a 38 y.o. male who presents today for an general visit to be evaluated for the following condition(s):  Chief Complaint   Patient presents with    bruising/lumps     On feet           REVIEW OF SYSTEM      Review of Systems   Constitutional:  Negative for chills and fever.   HENT:  Negative for congestion, postnasal drip and sore throat.    Respiratory:  Negative for chest tightness and shortness of breath.    Cardiovascular:  Negative for chest pain.   Gastrointestinal:  Negative for abdominal distention and abdominal pain.   Genitourinary:  Negative for difficulty urinating.       HISTORY OF PRESENT ILLNESS     38M PMH elevated A1c, HLD, HTN, obesity     Having bruising/lumps on his left foot about 1 month. 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 Started on left foot, then right. He has 3 lesions on his left foot and several on his right. Taking ibuprofen alleviates the pain. Has never happened before.     He ran out of his amlodipine 2 weeks ago and needs a refill. His BP is elevated today.     -----    He is tolerating the amlodipine OK, but his blood pressure remains elevated.    He right nose/sinus feels still clogged despite finishing the augmentin and prednisone.     His labs showed elevated cholesterol and A1c.     ----    Having right sided nasal congestion for the last 2 months. Having bloody nose every time he blows his nose. Intermittent ear congestion with popping. No ear discharge, fever, chills. Having sore throat but no trickle in the back of the throat. Has cough that is dry. No SOB or wheezing. Has taken mucinex and flonase on and off for a couple of months without relief.    His blood pressure remains

## 2024-04-12 LAB
VWF AG ACT/NOR PPP IA: 77 % (ref 52–214)
VWF:RCO ACT/NOR PPP PL AGG: 37 % (ref 51–215)

## 2024-04-15 LAB — FACT V ACT/NOR PPP: 138 % (ref 50–150)

## 2024-04-23 ASSESSMENT — ENCOUNTER SYMPTOMS
CHEST TIGHTNESS: 0
ABDOMINAL PAIN: 0
ABDOMINAL DISTENTION: 0
SHORTNESS OF BREATH: 0
SORE THROAT: 0

## 2024-04-23 NOTE — PROGRESS NOTES
[facility]  [facility]  DR. KANDICE WOLF  [address], [address]  [place] [id number]      Date of Visit:  2024  Patient Name: Berny Hoyos   Patient :  1985     CHIEF COMPLAINT:     Berny Hoyos is a 38 y.o. male who presents today for an general visit to be evaluated for the following condition(s):  Chief Complaint   Patient presents with    2 Week Follow-Up     REVIEW OF SYSTEM      Review of Systems   Constitutional:  Negative for chills and fever.   HENT:  Negative for congestion, postnasal drip and sore throat.    Respiratory:  Negative for chest tightness and shortness of breath.    Cardiovascular:  Negative for chest pain.   Gastrointestinal:  Negative for abdominal distention and abdominal pain.   Genitourinary:  Negative for difficulty urinating.       HISTORY OF PRESENT ILLNESS     38M PMH elevated A1c, HLD, HTN, obesity     He was diagnosed with bilateral otitis media at the urgent care and started on omnicef. He is feeling better today.     His skin rash along his feet is getting better. His labwork was negative.    His blood pressure has improved from last visit but remains elevated. He is taking his amlodipine consistently.     ----    Having bruising/lumps on his left foot about 1 month. 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 Started on left foot, then right. He has 3 lesions on his left foot and several on his right. Taking ibuprofen alleviates the pain. Has never happened before.     He ran out of his amlodipine 2 weeks ago and needs a refill. His BP is elevated today.     -----    He is tolerating the amlodipine OK, but his blood pressure remains elevated.    He right nose/sinus feels still clogged despite finishing the augmentin and prednisone.     His labs showed elevated cholesterol and A1c.     ----    Having right sided nasal congestion for the last 2 months. Having bloody nose every time he

## 2024-04-24 ENCOUNTER — OFFICE VISIT (OUTPATIENT)
Dept: FAMILY MEDICINE CLINIC | Age: 39
End: 2024-04-24
Payer: COMMERCIAL

## 2024-04-24 VITALS
BODY MASS INDEX: 39.74 KG/M2 | DIASTOLIC BLOOD PRESSURE: 96 MMHG | HEIGHT: 70 IN | RESPIRATION RATE: 16 BRPM | WEIGHT: 277.6 LBS | HEART RATE: 96 BPM | SYSTOLIC BLOOD PRESSURE: 146 MMHG | TEMPERATURE: 98 F | OXYGEN SATURATION: 95 %

## 2024-04-24 DIAGNOSIS — R23.3 PETECHIAE: ICD-10-CM

## 2024-04-24 DIAGNOSIS — I10 PRIMARY HYPERTENSION: Primary | ICD-10-CM

## 2024-04-24 PROCEDURE — 3080F DIAST BP >= 90 MM HG: CPT | Performed by: STUDENT IN AN ORGANIZED HEALTH CARE EDUCATION/TRAINING PROGRAM

## 2024-04-24 PROCEDURE — 3077F SYST BP >= 140 MM HG: CPT | Performed by: STUDENT IN AN ORGANIZED HEALTH CARE EDUCATION/TRAINING PROGRAM

## 2024-04-24 PROCEDURE — 99213 OFFICE O/P EST LOW 20 MIN: CPT | Performed by: STUDENT IN AN ORGANIZED HEALTH CARE EDUCATION/TRAINING PROGRAM

## 2024-04-24 RX ORDER — HYDROCHLOROTHIAZIDE 25 MG/1
25 TABLET ORAL EVERY MORNING
Qty: 30 TABLET | Refills: 2 | Status: SHIPPED | OUTPATIENT
Start: 2024-04-24

## 2024-04-24 RX ORDER — CEFDINIR 300 MG/1
CAPSULE ORAL
COMMUNITY
Start: 2024-04-18

## 2024-05-07 ENCOUNTER — OFFICE VISIT (OUTPATIENT)
Dept: FAMILY MEDICINE CLINIC | Age: 39
End: 2024-05-07
Payer: COMMERCIAL

## 2024-05-07 VITALS
RESPIRATION RATE: 16 BRPM | SYSTOLIC BLOOD PRESSURE: 141 MMHG | TEMPERATURE: 97.9 F | HEART RATE: 86 BPM | BODY MASS INDEX: 39.43 KG/M2 | OXYGEN SATURATION: 96 % | HEIGHT: 70 IN | WEIGHT: 275.4 LBS | DIASTOLIC BLOOD PRESSURE: 99 MMHG

## 2024-05-07 DIAGNOSIS — Z87.19 HISTORY OF DIVERTICULITIS: ICD-10-CM

## 2024-05-07 DIAGNOSIS — L30.9 DERMATITIS: Primary | ICD-10-CM

## 2024-05-07 DIAGNOSIS — I10 PRIMARY HYPERTENSION: ICD-10-CM

## 2024-05-07 PROCEDURE — 3075F SYST BP GE 130 - 139MM HG: CPT | Performed by: STUDENT IN AN ORGANIZED HEALTH CARE EDUCATION/TRAINING PROGRAM

## 2024-05-07 PROCEDURE — 99214 OFFICE O/P EST MOD 30 MIN: CPT | Performed by: STUDENT IN AN ORGANIZED HEALTH CARE EDUCATION/TRAINING PROGRAM

## 2024-05-07 PROCEDURE — 3080F DIAST BP >= 90 MM HG: CPT | Performed by: STUDENT IN AN ORGANIZED HEALTH CARE EDUCATION/TRAINING PROGRAM

## 2024-05-07 RX ORDER — LISINOPRIL 20 MG/1
20 TABLET ORAL DAILY
Qty: 30 TABLET | Refills: 0 | Status: SHIPPED | OUTPATIENT
Start: 2024-05-07

## 2024-05-07 RX ORDER — AMOXICILLIN AND CLAVULANATE POTASSIUM 875; 125 MG/1; MG/1
1 TABLET, FILM COATED ORAL EVERY 12 HOURS
COMMUNITY
Start: 2024-04-30 | End: 2024-05-10

## 2024-05-07 RX ORDER — TIRZEPATIDE 2.5 MG/.5ML
2.5 INJECTION, SOLUTION SUBCUTANEOUS WEEKLY
Qty: 2 ML | Refills: 0 | Status: SHIPPED | OUTPATIENT
Start: 2024-05-07 | End: 2024-05-09 | Stop reason: ALTCHOICE

## 2024-05-07 RX ORDER — ONDANSETRON 4 MG/1
4 TABLET, ORALLY DISINTEGRATING ORAL EVERY 8 HOURS PRN
COMMUNITY
Start: 2024-04-30

## 2024-05-07 RX ORDER — HYDROCODONE BITARTRATE AND ACETAMINOPHEN 5; 325 MG/1; MG/1
TABLET ORAL
COMMUNITY
Start: 2024-04-30

## 2024-05-07 ASSESSMENT — ENCOUNTER SYMPTOMS
SORE THROAT: 0
SHORTNESS OF BREATH: 0
CHEST TIGHTNESS: 0
ABDOMINAL PAIN: 0
ABDOMINAL DISTENTION: 0

## 2024-05-07 NOTE — PROGRESS NOTES
[facility] PHYSICIAN GROUP  [facility]  DR. KANDICE WOLF  [address], [address]  [place] [id number]      Date of Visit:  2024  Patient Name: Berny Hoyos   Patient :  1985     CHIEF COMPLAINT:     Berny Hoyos is a 38 y.o. male who presents today for an general visit to be evaluated for the following condition(s):  Chief Complaint   Patient presents with    Hypertension     Follow up     REVIEW OF SYSTEM      Review of Systems   Constitutional:  Negative for chills and fever.   HENT:  Negative for congestion, postnasal drip and sore throat.    Respiratory:  Negative for chest tightness and shortness of breath.    Cardiovascular:  Negative for chest pain.   Gastrointestinal:  Negative for abdominal distention and abdominal pain.   Genitourinary:  Negative for difficulty urinating.   Skin:  Positive for rash.       HISTORY OF PRESENT ILLNESS     38M PMH elevated A1c, HLD, HTN, obesity     He was seen by ER for diverticulitis and pneumonia. He was started on augmentin and his stomach pain has improved. Has several days left of his augmentin.     He started the hctz and his BP is improving.     His foot rash has worsened. He is having significant pain along the sides of his feet with superficial ulceration.     He is interested in starting tirzepatide for weight loss. His BMI is 40 despite attempts at diet and exercise.    ---    He was diagnosed with bilateral otitis media at the urgent care and started on omnicef. He is feeling better today.     His skin rash along his feet is getting better. His labwork was negative.    His blood pressure has improved from last visit but remains elevated. He is taking his amlodipine consistently.     ----    Having bruising/lumps on his left foot about 1 month. 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 Started on left foot, then right. He has 3 lesions on his left foot and several on his right. Taking

## 2024-05-08 ENCOUNTER — TELEPHONE (OUTPATIENT)
Dept: GASTROENTEROLOGY | Age: 39
End: 2024-05-08

## 2024-05-08 ENCOUNTER — TELEPHONE (OUTPATIENT)
Dept: FAMILY MEDICINE CLINIC | Age: 39
End: 2024-05-08

## 2024-05-08 NOTE — TELEPHONE ENCOUNTER
Called and left a VM for pt informing pt about BP medications and if he were to have any questions to give the office a call.

## 2024-05-08 NOTE — TELEPHONE ENCOUNTER
----- Message from Tigre Albright MD sent at 5/7/2024  5:10 PM EDT -----  Roselyn Franco, reviewed patient's blood pressure. Can we inform him that lisinopril 20mg daily was sent to his pharmacy to start taking in addition to his other 2 BP medications? Thank you

## 2024-05-08 NOTE — TELEPHONE ENCOUNTER
Writer left message for patient to call and schedule a new patient appointment with dr cuevas. From his referral.

## 2024-05-09 ENCOUNTER — OFFICE VISIT (OUTPATIENT)
Dept: FAMILY MEDICINE CLINIC | Age: 39
End: 2024-05-09
Payer: COMMERCIAL

## 2024-05-09 VITALS
HEART RATE: 104 BPM | DIASTOLIC BLOOD PRESSURE: 96 MMHG | OXYGEN SATURATION: 97 % | HEIGHT: 70 IN | RESPIRATION RATE: 16 BRPM | SYSTOLIC BLOOD PRESSURE: 143 MMHG | TEMPERATURE: 97.5 F | WEIGHT: 280.2 LBS | BODY MASS INDEX: 40.11 KG/M2

## 2024-05-09 DIAGNOSIS — L30.9 DERMATITIS: Primary | ICD-10-CM

## 2024-05-09 PROCEDURE — 99213 OFFICE O/P EST LOW 20 MIN: CPT | Performed by: STUDENT IN AN ORGANIZED HEALTH CARE EDUCATION/TRAINING PROGRAM

## 2024-05-09 PROCEDURE — 3077F SYST BP >= 140 MM HG: CPT | Performed by: STUDENT IN AN ORGANIZED HEALTH CARE EDUCATION/TRAINING PROGRAM

## 2024-05-09 PROCEDURE — 3080F DIAST BP >= 90 MM HG: CPT | Performed by: STUDENT IN AN ORGANIZED HEALTH CARE EDUCATION/TRAINING PROGRAM

## 2024-05-09 RX ORDER — TRAMADOL HYDROCHLORIDE 50 MG/1
50 TABLET ORAL EVERY 6 HOURS PRN
Qty: 12 TABLET | Refills: 0 | Status: SHIPPED | OUTPATIENT
Start: 2024-05-09 | End: 2024-05-12

## 2024-05-09 RX ORDER — PREDNISONE 20 MG/1
20 TABLET ORAL 2 TIMES DAILY
Qty: 10 TABLET | Refills: 0 | Status: SHIPPED | OUTPATIENT
Start: 2024-05-09 | End: 2024-05-14

## 2024-05-09 ASSESSMENT — ENCOUNTER SYMPTOMS
CHEST TIGHTNESS: 0
SHORTNESS OF BREATH: 0
SORE THROAT: 0
ABDOMINAL DISTENTION: 0
ABDOMINAL PAIN: 0

## 2024-05-09 NOTE — PROGRESS NOTES
EXTRACTION          Social History     Socioeconomic History    Marital status:      Spouse name: None    Number of children: None    Years of education: None    Highest education level: None   Tobacco Use    Smoking status: Never    Smokeless tobacco: Never   Vaping Use    Vaping Use: Never used   Substance and Sexual Activity    Alcohol use: Yes     Alcohol/week: 12.0 standard drinks of alcohol     Types: 12 Cans of beer per week     Comment: weekly    Drug use: Never    Sexual activity: Yes     Partners: Female     Social Determinants of Health     Financial Resource Strain: Low Risk  (12/12/2023)    Overall Financial Resource Strain (CARDIA)     Difficulty of Paying Living Expenses: Not hard at all   Food Insecurity: No Food Insecurity (12/12/2023)    Hunger Vital Sign     Worried About Running Out of Food in the Last Year: Never true     Ran Out of Food in the Last Year: Never true   Transportation Needs: Unknown (12/12/2023)    PRAPARE - Transportation     Lack of Transportation (Non-Medical): No   Housing Stability: Unknown (12/12/2023)    Housing Stability Vital Sign     Unstable Housing in the Last Year: No        Family History   Problem Relation Age of Onset    Heart Disease Father     High Blood Pressure Father     Heart Attack Father 56    Heart Disease Paternal Grandfather     Heart Attack Paternal Grandfather        PHYSICAL EXAM     BP (!) 143/96   Pulse (!) 104   Temp 97.5 °F (36.4 °C)   Resp 16   Ht 1.765 m (5' 9.5\")   Wt 127.1 kg (280 lb 3.2 oz)   SpO2 97%   BMI 40.79 kg/m²    Physical Exam  Constitutional:       Appearance: Normal appearance.   HENT:      Head: Atraumatic.   Cardiovascular:      Rate and Rhythm: Normal rate and regular rhythm.      Pulses: Normal pulses.      Heart sounds: Normal heart sounds. No murmur heard.     No friction rub. No gallop.   Pulmonary:      Effort: Pulmonary effort is normal. No respiratory distress.      Breath sounds: Normal breath sounds.

## 2024-05-10 ENCOUNTER — TELEPHONE (OUTPATIENT)
Dept: FAMILY MEDICINE CLINIC | Age: 39
End: 2024-05-10

## 2024-05-10 NOTE — TELEPHONE ENCOUNTER
Talked to patient, advised that he hold off on amlodipine and lisinopril at this time in case they are contributing to his feet swelling. Advised he can remain on hctz until his upcoming follow up in 1 week to reassess his rash and blood pressure.

## 2024-05-10 NOTE — TELEPHONE ENCOUNTER
Patient would like a call from Dr. Albright to discuss his BP medication. He said he has been getting blisters on his feet and didn't know why, but when he ran out of his BP med they went away. I did explain that Dr. Albright is not in the office today but would see the message and either he will call him or he will have one of the staff call him. He does have a follow up appointment on 05/20/2024.

## 2024-05-16 NOTE — PROGRESS NOTES
appointment for ongoing evaluation. Once he schedules an appointment, will complete FMLA paperwork reflecting the period of time he took off work until his next dermatology appointment.     Return in about 2 weeks (around 6/3/2024) for f/u HTN .    COMMUNICATION:       Electronically signed by Tigre Albright MD on 5/20/2024 at 10:40 AM

## 2024-05-20 ENCOUNTER — TELEPHONE (OUTPATIENT)
Dept: FAMILY MEDICINE CLINIC | Age: 39
End: 2024-05-20

## 2024-05-20 ENCOUNTER — OFFICE VISIT (OUTPATIENT)
Dept: FAMILY MEDICINE CLINIC | Age: 39
End: 2024-05-20
Payer: COMMERCIAL

## 2024-05-20 VITALS
BODY MASS INDEX: 40.09 KG/M2 | SYSTOLIC BLOOD PRESSURE: 149 MMHG | HEART RATE: 99 BPM | WEIGHT: 280 LBS | TEMPERATURE: 97.9 F | HEIGHT: 70 IN | RESPIRATION RATE: 16 BRPM | OXYGEN SATURATION: 96 % | DIASTOLIC BLOOD PRESSURE: 108 MMHG

## 2024-05-20 DIAGNOSIS — L30.9 DERMATITIS: Primary | ICD-10-CM

## 2024-05-20 DIAGNOSIS — M25.511 ACUTE PAIN OF RIGHT SHOULDER: ICD-10-CM

## 2024-05-20 DIAGNOSIS — I10 PRIMARY HYPERTENSION: ICD-10-CM

## 2024-05-20 PROCEDURE — 3077F SYST BP >= 140 MM HG: CPT | Performed by: STUDENT IN AN ORGANIZED HEALTH CARE EDUCATION/TRAINING PROGRAM

## 2024-05-20 PROCEDURE — 99213 OFFICE O/P EST LOW 20 MIN: CPT | Performed by: STUDENT IN AN ORGANIZED HEALTH CARE EDUCATION/TRAINING PROGRAM

## 2024-05-20 PROCEDURE — 3080F DIAST BP >= 90 MM HG: CPT | Performed by: STUDENT IN AN ORGANIZED HEALTH CARE EDUCATION/TRAINING PROGRAM

## 2024-05-20 RX ORDER — CEPHALEXIN 500 MG/1
500 CAPSULE ORAL 3 TIMES DAILY
COMMUNITY
Start: 2024-05-18 | End: 2024-05-25

## 2024-05-20 RX ORDER — IBUPROFEN 800 MG/1
800 TABLET ORAL 3 TIMES DAILY PRN
Qty: 30 TABLET | Refills: 1 | Status: SHIPPED | OUTPATIENT
Start: 2024-05-20

## 2024-05-20 RX ORDER — MOMETASONE FUROATE 1 MG/G
OINTMENT TOPICAL
COMMUNITY
Start: 2024-05-14

## 2024-05-20 RX ORDER — HYDROCHLOROTHIAZIDE 25 MG/1
25 TABLET ORAL EVERY MORNING
Qty: 30 TABLET | Refills: 2 | Status: SHIPPED | OUTPATIENT
Start: 2024-05-20

## 2024-05-20 RX ORDER — PREDNISONE 50 MG/1
50 TABLET ORAL DAILY
COMMUNITY
Start: 2024-05-18 | End: 2024-05-23

## 2024-07-25 ENCOUNTER — TELEPHONE (OUTPATIENT)
Dept: FAMILY MEDICINE CLINIC | Age: 39
End: 2024-07-25

## 2024-07-25 NOTE — TELEPHONE ENCOUNTER
Patient needs a signed letter from PCP stating that due to a reaction to the medication lisinopril he was prescribed by PCP he had to take multiple days off of work for FMLA. Patient extreme gut pain (was in ER) and not being able to walk on his foot/ankle due to severe swelling, redness and pain.     Dates:   05/17/2024 foot concerns arise and slowly get worse, patient is still able to work. On 05/19/2024 the patient is unable to go into work due to pain being unbearable.  April 30, 2024: Extreme gut pain occurs. Patient was in the ER which caused him to be off 04/30/2024, 05/01/2024 and 05/02/2024.    Patient runs out of lisinopril and is doing alright, doesn't think anything about it and gets a refill of the medication and starts noticing symptoms again. This is when the patient realizes the connection.     May 9th blisters are so bad on his feet he can not put boots on to work. Patient can not go into work.     Patient's place of employment is requiring a signed letter from PCP listing the following dates below in order for them to be excused due to a medication reaction the patient had to lisinopril  Dates:   04/19/2024 04/30/2024 05/01/2024 05/02/2024 05/09/2024    Patient did state that he had many phone calls, and visits regarding all of this and that PCP should be very familiar with what he went through.     Please advise.

## 2024-07-25 NOTE — TELEPHONE ENCOUNTER
Work excuse noted provided for missed worked dates discussed at previous visits. This accounts for 1 work date that was missed on 5/9/24 after lisinopril was started on 5/7/24.

## 2024-08-20 ENCOUNTER — TELEPHONE (OUTPATIENT)
Dept: FAMILY MEDICINE CLINIC | Age: 39
End: 2024-08-20

## 2024-08-20 NOTE — TELEPHONE ENCOUNTER
Called and spoke with pt. Informed him that I will get this faxed over but he does still need to sign the paperwork. Pt stated understanding.

## 2024-08-20 NOTE — TELEPHONE ENCOUNTER
Patient stopped into office to  signed copy of Medical Release Authorization form.  Patient states it can be faxed over when complete.      FAX: [phone]  Antonio

## 2024-08-20 NOTE — TELEPHONE ENCOUNTER
Medical Release Authorization paper placed in PCP's folder to be completed by PCP/Clinical Staff. Please advise.

## 2024-08-21 ENCOUNTER — TELEPHONE (OUTPATIENT)
Dept: FAMILY MEDICINE CLINIC | Age: 39
End: 2024-08-21

## 2024-08-21 NOTE — TELEPHONE ENCOUNTER
Pt is calling in with a pinched nerve in his back. He said it is starting to affect his right arm. He is asking for an appointment today. He does not want to wait.

## 2024-08-22 ENCOUNTER — HOSPITAL ENCOUNTER (OUTPATIENT)
Dept: GENERAL RADIOLOGY | Age: 39
Discharge: HOME | End: 2024-08-24
Payer: COMMERCIAL

## 2024-08-22 ENCOUNTER — HOSPITAL ENCOUNTER (OUTPATIENT)
Dept: MRI IMAGING | Age: 39
Discharge: HOME | End: 2024-08-24
Payer: COMMERCIAL

## 2024-08-22 ENCOUNTER — HOSPITAL ENCOUNTER (OUTPATIENT)
Age: 39
Discharge: HOME | End: 2024-08-24
Payer: COMMERCIAL

## 2024-08-22 ENCOUNTER — OFFICE VISIT (OUTPATIENT)
Dept: FAMILY MEDICINE CLINIC | Age: 39
End: 2024-08-22
Payer: COMMERCIAL

## 2024-08-22 VITALS
BODY MASS INDEX: 39.91 KG/M2 | TEMPERATURE: 97.7 F | WEIGHT: 278.8 LBS | OXYGEN SATURATION: 96 % | RESPIRATION RATE: 16 BRPM | HEIGHT: 70 IN | DIASTOLIC BLOOD PRESSURE: 114 MMHG | HEART RATE: 103 BPM | SYSTOLIC BLOOD PRESSURE: 160 MMHG

## 2024-08-22 DIAGNOSIS — M54.12 CERVICAL RADICULOPATHY: Primary | ICD-10-CM

## 2024-08-22 DIAGNOSIS — M54.12 CERVICAL RADICULOPATHY: ICD-10-CM

## 2024-08-22 DIAGNOSIS — I10 PRIMARY HYPERTENSION: ICD-10-CM

## 2024-08-22 DIAGNOSIS — M25.511 ACUTE PAIN OF RIGHT SHOULDER: ICD-10-CM

## 2024-08-22 DIAGNOSIS — Z01.89 ENCOUNTER FOR IMAGING TO SCREEN FOR METAL PRIOR TO MRI: ICD-10-CM

## 2024-08-22 PROCEDURE — 3080F DIAST BP >= 90 MM HG: CPT | Performed by: STUDENT IN AN ORGANIZED HEALTH CARE EDUCATION/TRAINING PROGRAM

## 2024-08-22 PROCEDURE — 72040 X-RAY EXAM NECK SPINE 2-3 VW: CPT

## 2024-08-22 PROCEDURE — 3077F SYST BP >= 140 MM HG: CPT | Performed by: STUDENT IN AN ORGANIZED HEALTH CARE EDUCATION/TRAINING PROGRAM

## 2024-08-22 PROCEDURE — 70030 X-RAY EYE FOR FOREIGN BODY: CPT

## 2024-08-22 PROCEDURE — 72141 MRI NECK SPINE W/O DYE: CPT

## 2024-08-22 PROCEDURE — 99214 OFFICE O/P EST MOD 30 MIN: CPT | Performed by: STUDENT IN AN ORGANIZED HEALTH CARE EDUCATION/TRAINING PROGRAM

## 2024-08-22 RX ORDER — TRAMADOL HYDROCHLORIDE 50 MG/1
50 TABLET ORAL EVERY 6 HOURS PRN
Qty: 12 TABLET | Refills: 0 | Status: SHIPPED | OUTPATIENT
Start: 2024-08-22 | End: 2024-08-25

## 2024-08-22 RX ORDER — IBUPROFEN 800 MG/1
800 TABLET ORAL 3 TIMES DAILY PRN
Qty: 30 TABLET | Refills: 1 | Status: SHIPPED | OUTPATIENT
Start: 2024-08-22

## 2024-08-22 ASSESSMENT — ENCOUNTER SYMPTOMS
SHORTNESS OF BREATH: 0
ABDOMINAL PAIN: 0
ABDOMINAL DISTENTION: 0
CHEST TIGHTNESS: 0
SORE THROAT: 0

## 2024-08-22 NOTE — PROGRESS NOTES
[facility] PHYSICIAN GROUP  [facility]  DR. KANDICE WOLF  [address], [address]  [place] [address]      Date of Visit:  2024  Patient Name: Berny Hoyos   Patient :  1985     CHIEF COMPLAINT:     Berny Hoyos is a 38 y.o. male who presents today for an general visit to be evaluated for the following condition(s):  Chief Complaint   Patient presents with    Pinched nerve     Right shoulder  Numbness down arm and fingers     REVIEW OF SYSTEM      Review of Systems   Constitutional:  Negative for chills and fever.   HENT:  Negative for congestion, postnasal drip and sore throat.    Respiratory:  Negative for chest tightness and shortness of breath.    Cardiovascular:  Negative for chest pain.   Gastrointestinal:  Negative for abdominal distention and abdominal pain.   Genitourinary:  Negative for difficulty urinating.   Skin:  Positive for rash.       HISTORY OF PRESENT ILLNESS     38M PMH elevated A1c, HLD, HTN, obesity     On  he was lifiting furniture and has been having severe neck/right shoulder pain with numbness ever since. He is having weakness in his hand due to the pain. He is also endorsing numbness in his right forearm. 10/10 intensity. He went to the urgent care and was prescribed prednisone and a muscle relaxer, but his pain remains uncontrolled.     ----    He saw dermatology and was advised to continue holding off on the amlodipine and lisinopril out of concern for drug reaction. He was told it would take 2-4 weeks for the rash to resolve. In between this time, he went to the ER on Saturday and was diagnosed with cellulitis and started on prednisone/keflex. He is still unable to work and is requesting an extension of his FMLA because he is on his feet often. He hasn't scheduled an appointment with dermatology for follow up.     ----    His foot swelling and pain have worsened from last visit. He states that his feet swelling and rash developed

## 2024-08-23 ENCOUNTER — HOSPITAL ENCOUNTER (OUTPATIENT)
Dept: PHYSICAL THERAPY | Facility: CLINIC | Age: 39
Setting detail: THERAPIES SERIES
Discharge: HOME | End: 2024-08-23
Payer: COMMERCIAL

## 2024-08-23 ENCOUNTER — TELEPHONE (OUTPATIENT)
Dept: FAMILY MEDICINE CLINIC | Age: 39
End: 2024-08-23

## 2024-08-23 DIAGNOSIS — M48.02 CERVICAL STENOSIS OF SPINAL CANAL: Primary | ICD-10-CM

## 2024-08-23 PROCEDURE — 97140 MANUAL THERAPY 1/> REGIONS: CPT

## 2024-08-23 PROCEDURE — 97162 PT EVAL MOD COMPLEX 30 MIN: CPT

## 2024-08-23 PROCEDURE — 97016 VASOPNEUMATIC DEVICE THERAPY: CPT

## 2024-08-23 NOTE — TELEPHONE ENCOUNTER
Patient dropped off Request for Medical Leave ([employer] [place]) paperwork to be completed by PCP/Clinical Staff.     Blank Copy placed in PCP's folder at the , as well as scanned into .

## 2024-08-23 NOTE — CONSULTS
compression  Precautions: Radicular pain extremely irritable to cervical closing  Exercises:  Exercise Reps/ Time Weight/ Level Comments                                 Other:  Deferred therex today due to pain highly irritable after recent injury    Manual therapy:   SOR performed with intermittent manual traction in supine: 10'    Specific Instructions for next treatment: Assess tolerance to manual therapy today; add in postural and cervical deep neck flexor training if tolerated.   Assess UEFI      Evaluation Complexity:  History (Personal factors, comorbidities) [] 0 [x] 1-2 [] 3+   Exam (limitations, restrictions) [] 1-2 [x] 3 [] 4+   Clinical presentation (progression) [] Stable [x] Evolving  [] Unstable   Decision Making [] Low [x] Moderate [] High    [] Low Complexity [x] Moderate Complexity [] High Complexity       Treatment Charges: Mins Units   [x] Evaluation       []  Low       [x]  Moderate       []  High  1   []  Modalities     []  Ther Exercise     [x]  Manual Therapy 10 1   []  Ther Activities     []  Aquatics     [x]  Vasocompression 10 1   []  Other       TOTAL BILLABLE TIME: 45 minutes    Time in: 3:45 PM    Time Out: 4:30 PM    Electronically signed by: DULCE ADAMES PT        Physician Signature:________________________________Date:__________________  By signing above or cosigning this note, I have reviewed this plan of care and certify a need for medically necessary rehabilitation services.     *PLEASE SIGN ABOVE AND FAX BACK ALL PAGES*

## 2024-08-28 ASSESSMENT — ENCOUNTER SYMPTOMS
SORE THROAT: 0
ABDOMINAL PAIN: 0
ABDOMINAL DISTENTION: 0
CHEST TIGHTNESS: 0
SHORTNESS OF BREATH: 0

## 2024-08-28 NOTE — PROGRESS NOTES
Comment: weekly    Drug use: Never    Sexual activity: Yes     Partners: Female     Social Determinants of Health     Financial Resource Strain: Low Risk  (12/12/2023)    Overall Financial Resource Strain (CARDIA)     Difficulty of Paying Living Expenses: Not hard at all   Food Insecurity: No Food Insecurity (5/18/2024)    [facility] [address]    Hunger Screening     Within the past 12 months we worried whether our food would run out before we got money to buy more.: Never True     Within the past 12 months the food we bought just didn't last and we didn't have money to get more.: Never True   Transportation Needs: Unknown (12/12/2023)    PRAPARE - Transportation     Lack of Transportation (Non-Medical): No   Housing Stability: Unknown (12/12/2023)    Housing Stability Vital Sign     Unstable Housing in the Last Year: No        Family History   Problem Relation Age of Onset    Heart Disease Father     High Blood Pressure Father     Heart Attack Father 56    Heart Disease Paternal Grandfather     Heart Attack Paternal Grandfather        PHYSICAL EXAM     BP (!) 182/118   Pulse (!) 116   Temp 97.7 °F (36.5 °C)   Resp 16   Ht 1.765 m (5' 9.5\")   Wt 127.9 kg (282 lb)   SpO2 97%   BMI 41.05 kg/m²    Physical Exam  Constitutional:       Appearance: Normal appearance.   HENT:      Head: Atraumatic.   Cardiovascular:      Rate and Rhythm: Normal rate and regular rhythm.      Pulses: Normal pulses.      Heart sounds: Normal heart sounds. No murmur heard.     No friction rub. No gallop.   Pulmonary:      Effort: Pulmonary effort is normal. No respiratory distress.      Breath sounds: Normal breath sounds.   Neurological:      Mental Status: He is alert.   Psychiatric:         Mood and Affect: Mood normal.         ASSESSMENT/PLAN     1. Cervical stenosis of spinal canal  Not improving. Will start gabapentin 300mg tid and refill ibuprofen/flexeril. Continue with PT and PM.  Given the severity of his neuropathy, will place referral to NSG at this time for consultation. F/u in 2 weeks to reassess.   - ibuprofen (ADVIL;MOTRIN) 800 MG tablet; Take 1 tablet by mouth 3 times daily as needed for Pain  Dispense: 30 tablet; Refill: 1  - gabapentin (NEURONTIN) 300 MG capsule; Take 1 capsule by mouth 3 times daily for 90 days. Intended supply: 30 days  Dispense: 90 capsule; Refill: 2  - cyclobenzaprine (FLEXERIL) 10 MG tablet; Take 1 tablet by mouth 3 times daily as needed for Muscle spasms  Dispense: 30 tablet; Refill: 2  - Tabitha Cullen DO, Neurosurgery, [facility]    2. Primary hypertension  BP remains elevated, he had swelling with amlodipine and lisinopril int he past. Will start metoprolol XL 100mg daily and reassess in 2 weeks.  - metoprolol succinate (TOPROL XL) 100 MG extended release tablet; Take 1 tablet by mouth daily  Dispense: 30 tablet; Refill: 1    Return in about 2 weeks (around 9/12/2024) for f/u cervical radic .    COMMUNICATION:       Electronically signed by Tigre Albright MD on 8/29/2024 at 11:10 AM

## 2024-08-29 ENCOUNTER — OFFICE VISIT (OUTPATIENT)
Dept: FAMILY MEDICINE CLINIC | Age: 39
End: 2024-08-29
Payer: COMMERCIAL

## 2024-08-29 VITALS
WEIGHT: 282 LBS | TEMPERATURE: 97.7 F | HEART RATE: 116 BPM | HEIGHT: 70 IN | BODY MASS INDEX: 40.37 KG/M2 | RESPIRATION RATE: 16 BRPM | DIASTOLIC BLOOD PRESSURE: 118 MMHG | OXYGEN SATURATION: 97 % | SYSTOLIC BLOOD PRESSURE: 182 MMHG

## 2024-08-29 DIAGNOSIS — I10 PRIMARY HYPERTENSION: ICD-10-CM

## 2024-08-29 DIAGNOSIS — M48.02 CERVICAL STENOSIS OF SPINAL CANAL: Primary | ICD-10-CM

## 2024-08-29 PROCEDURE — 3080F DIAST BP >= 90 MM HG: CPT | Performed by: STUDENT IN AN ORGANIZED HEALTH CARE EDUCATION/TRAINING PROGRAM

## 2024-08-29 PROCEDURE — 3077F SYST BP >= 140 MM HG: CPT | Performed by: STUDENT IN AN ORGANIZED HEALTH CARE EDUCATION/TRAINING PROGRAM

## 2024-08-29 PROCEDURE — 99214 OFFICE O/P EST MOD 30 MIN: CPT | Performed by: STUDENT IN AN ORGANIZED HEALTH CARE EDUCATION/TRAINING PROGRAM

## 2024-08-29 RX ORDER — CYCLOBENZAPRINE HCL 10 MG
10 TABLET ORAL 3 TIMES DAILY PRN
Qty: 30 TABLET | Refills: 2 | Status: SHIPPED | OUTPATIENT
Start: 2024-08-29 | End: 2024-09-28

## 2024-08-29 RX ORDER — IBUPROFEN 800 MG/1
800 TABLET, FILM COATED ORAL 3 TIMES DAILY PRN
Qty: 30 TABLET | Refills: 1 | Status: SHIPPED | OUTPATIENT
Start: 2024-08-29

## 2024-08-29 RX ORDER — GABAPENTIN 300 MG/1
300 CAPSULE ORAL 3 TIMES DAILY
Qty: 90 CAPSULE | Refills: 2 | Status: SHIPPED | OUTPATIENT
Start: 2024-08-29 | End: 2024-11-27

## 2024-08-29 RX ORDER — METOPROLOL SUCCINATE 100 MG/1
100 TABLET, EXTENDED RELEASE ORAL DAILY
Qty: 30 TABLET | Refills: 1 | Status: SHIPPED | OUTPATIENT
Start: 2024-08-29

## 2024-08-30 ENCOUNTER — HOSPITAL ENCOUNTER (OUTPATIENT)
Dept: PHYSICAL THERAPY | Facility: CLINIC | Age: 39
Setting detail: THERAPIES SERIES
Discharge: HOME | End: 2024-08-30
Payer: COMMERCIAL

## 2024-08-30 PROCEDURE — 97140 MANUAL THERAPY 1/> REGIONS: CPT

## 2024-08-30 PROCEDURE — 97110 THERAPEUTIC EXERCISES: CPT

## 2024-08-30 PROCEDURE — 97016 VASOPNEUMATIC DEVICE THERAPY: CPT

## 2024-08-30 NOTE — FLOWSHEET NOTE
[] [facility]  Outpatient Rehabilitation &  Therapy  [address]  [phone]  [phone] [] [facility]  Outpatient Rehabilitation &  Therapy  [address]  P: [phone]  F: [phone] [x] [facility]  Outpatient Rehabilitation &  Therapy  [address]  P: [phone]  F: [phone] [] [facility]  Outpatient Rehabilitation &  Therapy  [address]  [phone]  [phone] [] [facility]  Outpatient Rehabilitation &  Therapy  [address]   P: [phone]  F: [phone]  [] [facility]  Outpatient Rehabilitation &  Therapy  [address]  P: [phone]  F: [phone] [] [facility]  Outpatient Rehabilitation &  Therapy  [address]  P: [phone]  F: [phone] [] [facility]  Outpatient Rehabilitation &  Therapy  [address]  P: [phone]  F: [phone] [] [facility]  Outpatient Rehabilitation &  Therapy  [address]  P: [phone]  F: [phone]  [] [facility] Outpatient Rehabilitation &  Therapy  [address]  P: [phone]  F: [phone]     Physical Therapy Daily Treatment Note    Date:  2024  Patient Name:  Berny Hoyos    :  1985  MRN: [id number]  Physician:     Tigre Albright MD   Insurance: - [facility]- beatriz yr, vis 60/60, comb PT/OT/ST/Cog Ther, copay $30, ded 600/0met, co-ins 20%, OOP 5000/4460rem, est 353.07   Medical Diagnosis: M54.12 (ICD-10-CM) - Cervical radiculopathy            Rehab Codes: M54.12 (ICD-10-CM) - Cervical radiculopathy   Onset Date: 24                 Next 's appt: Pending  Visit# / total visits: ;     Cancels/No Shows: 0/0    Subjective:    Pain:  [x] Yes  [] No Location: Cervical spine and (R) arm Pain Rating: (0-10 scale)

## 2024-09-03 ENCOUNTER — HOSPITAL ENCOUNTER (OUTPATIENT)
Dept: PHYSICAL THERAPY | Facility: CLINIC | Age: 39
Setting detail: THERAPIES SERIES
Discharge: HOME | End: 2024-09-03
Payer: COMMERCIAL

## 2024-09-03 PROCEDURE — 97110 THERAPEUTIC EXERCISES: CPT

## 2024-09-03 PROCEDURE — 97012 MECHANICAL TRACTION THERAPY: CPT

## 2024-09-03 NOTE — FLOWSHEET NOTE
[] [facility]  Outpatient Rehabilitation &  Therapy  [address]  [phone]  [phone] [] [facility]  Outpatient Rehabilitation &  Therapy  [address]  P: [phone]  F: [phone] [x] [facility]  Outpatient Rehabilitation &  Therapy  [address]  P: [phone]  F: [phone] [] [facility]  Outpatient Rehabilitation &  Therapy  [address]  [phone]  [phone] [] [facility]  Outpatient Rehabilitation &  Therapy  [address]   P: [phone]  F: [phone]  [] [facility]  Outpatient Rehabilitation &  Therapy  [address]  P: [phone]  F: [phone] [] [facility]  Outpatient Rehabilitation &  Therapy  [address]  P: [phone]  F: [phone] [] [facility]  Outpatient Rehabilitation &  Therapy  [address]  P: [phone]  F: [phone] [] [facility]  Outpatient Rehabilitation &  Therapy  [address]  P: [phone]  F: [phone]  [] [facility] Outpatient Rehabilitation &  Therapy  [address]  P: [phone]  F: [phone]     Physical Therapy Daily Treatment Note    Date:  9/3/2024  Patient Name:  Berny Hoyos    :  1985  MRN: [id number]  Physician:     Tigre Albright MD   Insurance: - [facility]- beatriz yr, vis 60/60, comb PT/OT/ST/Cog Ther, copay $30, ded 600/0met, co-ins 20%, OOP 5000/4460rem, est 353.07   Medical Diagnosis: M54.12 (ICD-10-CM) - Cervical radiculopathy            Rehab Codes: M54.12 (ICD-10-CM) - Cervical radiculopathy   Onset Date: 24                 Next 's appt: Pending  Visit# / total visits: 3/16     Cancels/No Shows: 0/0    Subjective:    Pain:  [x] Yes  [] No Location: Cervical spine and (R) arm Pain Rating: (0-10 scale)

## 2024-09-09 ENCOUNTER — HOSPITAL ENCOUNTER (OUTPATIENT)
Dept: GENERAL RADIOLOGY | Age: 39
Discharge: HOME | End: 2024-09-11
Payer: COMMERCIAL

## 2024-09-09 ENCOUNTER — OFFICE VISIT (OUTPATIENT)
Dept: NEUROSURGERY | Age: 39
End: 2024-09-09
Payer: COMMERCIAL

## 2024-09-09 ENCOUNTER — HOSPITAL ENCOUNTER (OUTPATIENT)
Age: 39
Discharge: HOME | End: 2024-09-11
Payer: COMMERCIAL

## 2024-09-09 ENCOUNTER — TELEPHONE (OUTPATIENT)
Dept: FAMILY MEDICINE CLINIC | Age: 39
End: 2024-09-09

## 2024-09-09 VITALS
HEART RATE: 92 BPM | DIASTOLIC BLOOD PRESSURE: 124 MMHG | BODY MASS INDEX: 41.43 KG/M2 | SYSTOLIC BLOOD PRESSURE: 166 MMHG | WEIGHT: 284.6 LBS

## 2024-09-09 DIAGNOSIS — M47.22 CERVICAL SPONDYLOSIS WITH RADICULOPATHY: ICD-10-CM

## 2024-09-09 DIAGNOSIS — M47.22 CERVICAL SPONDYLOSIS WITH RADICULOPATHY: Primary | ICD-10-CM

## 2024-09-09 DIAGNOSIS — I10 PRIMARY HYPERTENSION: Primary | ICD-10-CM

## 2024-09-09 PROCEDURE — 3077F SYST BP >= 140 MM HG: CPT

## 2024-09-09 PROCEDURE — 99204 OFFICE O/P NEW MOD 45 MIN: CPT

## 2024-09-09 PROCEDURE — 72050 X-RAY EXAM NECK SPINE 4/5VWS: CPT

## 2024-09-09 PROCEDURE — 3080F DIAST BP >= 90 MM HG: CPT

## 2024-09-09 RX ORDER — METOPROLOL TARTRATE 50 MG
50 TABLET ORAL 2 TIMES DAILY
Qty: 60 TABLET | Refills: 1 | Status: SHIPPED | OUTPATIENT
Start: 2024-09-09

## 2024-09-10 ASSESSMENT — ENCOUNTER SYMPTOMS
SORE THROAT: 0
CHEST TIGHTNESS: 0
ABDOMINAL PAIN: 0
SHORTNESS OF BREATH: 0
ABDOMINAL DISTENTION: 0

## 2024-09-12 ENCOUNTER — OFFICE VISIT (OUTPATIENT)
Dept: FAMILY MEDICINE CLINIC | Age: 39
End: 2024-09-12
Payer: COMMERCIAL

## 2024-09-12 VITALS
DIASTOLIC BLOOD PRESSURE: 110 MMHG | OXYGEN SATURATION: 96 % | RESPIRATION RATE: 16 BRPM | BODY MASS INDEX: 40.06 KG/M2 | SYSTOLIC BLOOD PRESSURE: 143 MMHG | HEART RATE: 87 BPM | HEIGHT: 70 IN | TEMPERATURE: 98.2 F | WEIGHT: 279.8 LBS

## 2024-09-12 DIAGNOSIS — M48.02 CERVICAL STENOSIS OF SPINAL CANAL: ICD-10-CM

## 2024-09-12 DIAGNOSIS — I10 PRIMARY HYPERTENSION: Primary | ICD-10-CM

## 2024-09-12 PROCEDURE — 3080F DIAST BP >= 90 MM HG: CPT | Performed by: STUDENT IN AN ORGANIZED HEALTH CARE EDUCATION/TRAINING PROGRAM

## 2024-09-12 PROCEDURE — 3077F SYST BP >= 140 MM HG: CPT | Performed by: STUDENT IN AN ORGANIZED HEALTH CARE EDUCATION/TRAINING PROGRAM

## 2024-09-12 PROCEDURE — 99213 OFFICE O/P EST LOW 20 MIN: CPT | Performed by: STUDENT IN AN ORGANIZED HEALTH CARE EDUCATION/TRAINING PROGRAM

## 2024-09-12 RX ORDER — CYCLOBENZAPRINE HCL 10 MG
10 TABLET ORAL 3 TIMES DAILY PRN
Qty: 30 TABLET | Refills: 2 | Status: SHIPPED | OUTPATIENT
Start: 2024-09-12 | End: 2024-10-12

## 2024-09-12 RX ORDER — GABAPENTIN 400 MG/1
400 CAPSULE ORAL 3 TIMES DAILY
Qty: 90 CAPSULE | Refills: 2 | Status: SHIPPED | OUTPATIENT
Start: 2024-09-12 | End: 2024-12-11

## 2024-09-12 RX ORDER — TRAMADOL HYDROCHLORIDE 50 MG/1
50 TABLET ORAL EVERY 4 HOURS PRN
Qty: 12 TABLET | Refills: 0 | Status: SHIPPED | OUTPATIENT
Start: 2024-09-12 | End: 2024-09-19

## 2024-09-13 ENCOUNTER — HOSPITAL ENCOUNTER (OUTPATIENT)
Dept: PHYSICAL THERAPY | Facility: CLINIC | Age: 39
Setting detail: THERAPIES SERIES
Discharge: HOME | End: 2024-09-13
Payer: COMMERCIAL

## 2024-09-13 PROCEDURE — 97012 MECHANICAL TRACTION THERAPY: CPT

## 2024-09-13 PROCEDURE — 97110 THERAPEUTIC EXERCISES: CPT

## 2024-09-16 ENCOUNTER — INITIAL CONSULT (OUTPATIENT)
Dept: PAIN MANAGEMENT | Age: 39
End: 2024-09-16
Payer: COMMERCIAL

## 2024-09-16 VITALS — HEIGHT: 69 IN | WEIGHT: 279 LBS | BODY MASS INDEX: 41.32 KG/M2

## 2024-09-16 DIAGNOSIS — M47.812 CERVICAL SPONDYLOSIS: ICD-10-CM

## 2024-09-16 DIAGNOSIS — M54.12 CERVICAL RADICULOPATHY: Primary | ICD-10-CM

## 2024-09-16 DIAGNOSIS — M50.30 DEGENERATIVE DISC DISEASE, CERVICAL: ICD-10-CM

## 2024-09-16 DIAGNOSIS — E66.01 CLASS 3 SEVERE OBESITY WITH BODY MASS INDEX (BMI) OF 40.0 TO 44.9 IN ADULT, UNSPECIFIED OBESITY TYPE, UNSPECIFIED WHETHER SERIOUS COMORBIDITY PRESENT (HCC): ICD-10-CM

## 2024-09-16 PROCEDURE — 99204 OFFICE O/P NEW MOD 45 MIN: CPT | Performed by: STUDENT IN AN ORGANIZED HEALTH CARE EDUCATION/TRAINING PROGRAM

## 2024-09-25 ENCOUNTER — HOSPITAL ENCOUNTER (OUTPATIENT)
Age: 39
Setting detail: SPECIMEN
Discharge: HOME | End: 2024-09-25

## 2024-09-25 ENCOUNTER — OFFICE VISIT (OUTPATIENT)
Dept: PRIMARY CARE CLINIC | Age: 39
End: 2024-09-25
Payer: COMMERCIAL

## 2024-09-25 VITALS
OXYGEN SATURATION: 98 % | DIASTOLIC BLOOD PRESSURE: 88 MMHG | WEIGHT: 280 LBS | BODY MASS INDEX: 41.47 KG/M2 | SYSTOLIC BLOOD PRESSURE: 138 MMHG | HEIGHT: 69 IN | HEART RATE: 88 BPM

## 2024-09-25 DIAGNOSIS — E66.01 CLASS 3 SEVERE OBESITY DUE TO EXCESS CALORIES WITH SERIOUS COMORBIDITY AND BODY MASS INDEX (BMI) OF 40.0 TO 44.9 IN ADULT: ICD-10-CM

## 2024-09-25 DIAGNOSIS — Z78.9 NEVER SMOKED TOBACCO: ICD-10-CM

## 2024-09-25 DIAGNOSIS — E78.5 DYSLIPIDEMIA: ICD-10-CM

## 2024-09-25 DIAGNOSIS — H66.002 NON-RECURRENT ACUTE SUPPURATIVE OTITIS MEDIA OF LEFT EAR WITHOUT SPONTANEOUS RUPTURE OF TYMPANIC MEMBRANE: ICD-10-CM

## 2024-09-25 DIAGNOSIS — I10 PRIMARY HYPERTENSION: Primary | ICD-10-CM

## 2024-09-25 DIAGNOSIS — R60.0 LOWER EXTREMITY EDEMA: ICD-10-CM

## 2024-09-25 DIAGNOSIS — E11.65 TYPE 2 DIABETES MELLITUS WITH HYPERGLYCEMIA, WITHOUT LONG-TERM CURRENT USE OF INSULIN (HCC): ICD-10-CM

## 2024-09-25 DIAGNOSIS — R73.03 PRE-DIABETES: ICD-10-CM

## 2024-09-25 DIAGNOSIS — I10 PRIMARY HYPERTENSION: ICD-10-CM

## 2024-09-25 LAB
ANION GAP SERPL CALCULATED.3IONS-SCNC: 14 MMOL/L (ref 9–16)
BASOPHILS # BLD: 0.08 K/UL (ref 0–0.2)
BASOPHILS NFR BLD: 1 % (ref 0–2)
BUN SERPL-MCNC: 16 MG/DL (ref 6–20)
CALCIUM SERPL-MCNC: 9.6 MG/DL (ref 8.6–10.4)
CHLORIDE SERPL-SCNC: 102 MMOL/L (ref 98–107)
CHOLEST SERPL-MCNC: 192 MG/DL (ref 0–199)
CHOLESTEROL/HDL RATIO: 5
CO2 SERPL-SCNC: 24 MMOL/L (ref 20–31)
CREAT SERPL-MCNC: 0.9 MG/DL (ref 0.7–1.2)
CREAT UR-MCNC: 114 MG/DL (ref 39–259)
EOSINOPHIL # BLD: 0.35 K/UL (ref 0–0.44)
EOSINOPHILS RELATIVE PERCENT: 3 % (ref 1–4)
ERYTHROCYTE [DISTWIDTH] IN BLOOD BY AUTOMATED COUNT: 12.8 % (ref 11.8–14.4)
EST. AVERAGE GLUCOSE BLD GHB EST-MCNC: 169 MG/DL
GFR, ESTIMATED: >90 ML/MIN/1.73M2
GLUCOSE SERPL-MCNC: 162 MG/DL (ref 74–99)
HBA1C MFR BLD: 7.5 % (ref 4–6)
HCT VFR BLD AUTO: 47.4 % (ref 40.7–50.3)
HDLC SERPL-MCNC: 35 MG/DL
HGB BLD-MCNC: 16.1 G/DL (ref 13–17)
IMM GRANULOCYTES # BLD AUTO: 0.04 K/UL (ref 0–0.3)
IMM GRANULOCYTES NFR BLD: 0 %
LDLC SERPL CALC-MCNC: 139 MG/DL (ref 0–100)
LYMPHOCYTES NFR BLD: 3.45 K/UL (ref 1.1–3.7)
LYMPHOCYTES RELATIVE PERCENT: 31 % (ref 24–43)
MCH RBC QN AUTO: 30.8 PG (ref 25.2–33.5)
MCHC RBC AUTO-ENTMCNC: 34 G/DL (ref 28.4–34.8)
MCV RBC AUTO: 90.6 FL (ref 82.6–102.9)
MICROALBUMIN UR-MCNC: 16 MG/L (ref 0–20)
MICROALBUMIN/CREAT UR-RTO: 14 MCG/MG CREAT (ref 0–17)
MONOCYTES NFR BLD: 0.81 K/UL (ref 0.1–1.2)
MONOCYTES NFR BLD: 7 % (ref 3–12)
NEUTROPHILS NFR BLD: 58 % (ref 36–65)
NEUTS SEG NFR BLD: 6.33 K/UL (ref 1.5–8.1)
NRBC BLD-RTO: 0 PER 100 WBC
PLATELET # BLD AUTO: 281 K/UL (ref 138–453)
PMV BLD AUTO: 10.2 FL (ref 8.1–13.5)
POTASSIUM SERPL-SCNC: 4.2 MMOL/L (ref 3.7–5.3)
RBC # BLD AUTO: 5.23 M/UL (ref 4.21–5.77)
SODIUM SERPL-SCNC: 140 MMOL/L (ref 136–145)
TRIGL SERPL-MCNC: 89 MG/DL
VLDLC SERPL CALC-MCNC: 18 MG/DL
WBC OTHER # BLD: 11.1 K/UL (ref 3.5–11.3)

## 2024-09-25 PROCEDURE — 3075F SYST BP GE 130 - 139MM HG: CPT | Performed by: STUDENT IN AN ORGANIZED HEALTH CARE EDUCATION/TRAINING PROGRAM

## 2024-09-25 PROCEDURE — 3051F HG A1C>EQUAL 7.0%<8.0%: CPT | Performed by: STUDENT IN AN ORGANIZED HEALTH CARE EDUCATION/TRAINING PROGRAM

## 2024-09-25 PROCEDURE — 93000 ELECTROCARDIOGRAM COMPLETE: CPT | Performed by: STUDENT IN AN ORGANIZED HEALTH CARE EDUCATION/TRAINING PROGRAM

## 2024-09-25 PROCEDURE — 3079F DIAST BP 80-89 MM HG: CPT | Performed by: STUDENT IN AN ORGANIZED HEALTH CARE EDUCATION/TRAINING PROGRAM

## 2024-09-25 PROCEDURE — 99214 OFFICE O/P EST MOD 30 MIN: CPT | Performed by: STUDENT IN AN ORGANIZED HEALTH CARE EDUCATION/TRAINING PROGRAM

## 2024-09-25 ASSESSMENT — PATIENT HEALTH QUESTIONNAIRE - PHQ9
1. LITTLE INTEREST OR PLEASURE IN DOING THINGS: NOT AT ALL
SUM OF ALL RESPONSES TO PHQ9 QUESTIONS 1 & 2: 0
SUM OF ALL RESPONSES TO PHQ QUESTIONS 1-9: 0
2. FEELING DOWN, DEPRESSED OR HOPELESS: NOT AT ALL
SUM OF ALL RESPONSES TO PHQ QUESTIONS 1-9: 0

## 2024-09-26 PROBLEM — Z78.9 NEVER SMOKED TOBACCO: Status: ACTIVE | Noted: 2024-09-26

## 2024-09-26 PROBLEM — R60.0 LOWER EXTREMITY EDEMA: Status: ACTIVE | Noted: 2024-09-26

## 2024-09-30 ENCOUNTER — OFFICE VISIT (OUTPATIENT)
Dept: NEUROSURGERY | Age: 39
End: 2024-09-30
Payer: COMMERCIAL

## 2024-09-30 VITALS
SYSTOLIC BLOOD PRESSURE: 138 MMHG | BODY MASS INDEX: 41 KG/M2 | HEIGHT: 69 IN | DIASTOLIC BLOOD PRESSURE: 89 MMHG | WEIGHT: 276.8 LBS | HEART RATE: 95 BPM

## 2024-09-30 DIAGNOSIS — M47.22 CERVICAL SPONDYLOSIS WITH RADICULOPATHY: Primary | ICD-10-CM

## 2024-09-30 PROCEDURE — 99214 OFFICE O/P EST MOD 30 MIN: CPT | Performed by: NEUROLOGICAL SURGERY

## 2024-09-30 PROCEDURE — 3075F SYST BP GE 130 - 139MM HG: CPT | Performed by: NEUROLOGICAL SURGERY

## 2024-09-30 PROCEDURE — 3079F DIAST BP 80-89 MM HG: CPT | Performed by: NEUROLOGICAL SURGERY

## 2024-09-30 NOTE — PROGRESS NOTES
Mri C -multilevel mild degenerative disease with kyphosis about the C6-C7 level along with right-sided paracentral and foraminal disc extrusion with significant neural compression.    Assessment and Plan:      1. Cervical spondylosis with radiculopathy         Assessment & Plan Plan: Patient with clear-cut evidence of a right-sided C7 radiculopathy with imaging evidence of kyphosis about the C6-C7 level along with significant foraminal and lateral recess stenosis.  Patient has been through exhaustive regimen including a steroid taper as well as PT for at least 4 weeks since the onset of 6 weeks ago.  In the interim he has had persistent pain but most notably has had progressive issues related to weakness dexterity loss of the right hand and complete loss of sensation in the C7 dermatomal distribution that has been progressive over the course of the ensuing weeks.  Patient was scheduled to get an epidural injection but his primary issue at this point is loss of dexterity weakness in the right upper extremity as well as sensory loss.  Overall I believe that his primary symptoms are related to function from loss of sensory and motor function which cannot be mitigated from an epidural injection so I believe the utility is fairly low.  I reviewed the procedure that would be recommended involving an anterior cervical discectomy osteophytectomy foraminotomy and arthroplasty at C6-C7 there would take approximately 2 hours with overnight hospital stay and 10 weeks of full recovery.  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  Risks include dysphagia hoarseness spinal cord injury nerve injury carotid injury tracheal injury esophageal perforation and other things related to anatomic localization.  Patient is cognizant of the risks and benefits and all questions were answered.  We will proceed with C6-C7 anterior approach arthroplasty.  I

## 2024-10-03 RX ORDER — SODIUM CHLORIDE, SODIUM LACTATE, POTASSIUM CHLORIDE, CALCIUM CHLORIDE 600; 310; 30; 20 MG/100ML; MG/100ML; MG/100ML; MG/100ML
INJECTION, SOLUTION INTRAVENOUS CONTINUOUS
OUTPATIENT
Start: 2024-10-03

## 2024-10-03 NOTE — DISCHARGE INSTRUCTIONS
Preoperative Instructions:    Stop eating solid foods at midnight the night prior to surgery.    Stop drinking clear liquids at midnight the night prior to surgery.    Arrive at the surgery center (Entrance B) by 6:00 on 10/18/2024  (or as directed by your surgeon's office).    Please stop any blood thinning medications as directed by your surgeon or prescribing physician. Failure to stop certain medications may interfere with your scheduled surgery.    These may include:  Aspirin, Warfarin (Coumadin), Clopidogrel (Plavix), Ibuprofen (Motrin, Advil), Naproxen (Aleve), Meloxicam (Mobic), Celecoxib (Celebrex), Eliquis, Pradaxa, Xarelto, Effient, Fish Oil, Herbal supplements.    You may continue the rest of your medications through the night before surgery unless instructed otherwise.    Please take only the following medication(s) the day of surgery with a small sip of water:  Lopressor/metoprolol, gabapentin/neurontin    Please use and bring inhalers the day of surgery, if applies.  Please bring CPAP the day of surgery, if applies.    PLEASE NOTE:  THE ABOVE (IF ANY) DISCONTINUED MEDS MAY ONLY BE FROM   \"CLEANING UP\" THE MED LIST AND WERE NOT ACTUALLY CANCELLED; SEE CHART FOR DETAILS AND ALWAYS CHECK WITH PRESCRIBING PROVIDER BEFORE DISCONTINUING ANY MEDICATIONS        REMINDERS:  ** If you are going home the day of your procedure, you will need a friend or family member to drive you home after your procedure.  Your  must be 18 years of age or older and able to sign off on your discharge instructions.  Taxi cabs or any form of public transportation is not acceptable.    ** It is preferable that the friend or family member stay at the hospital throughout your procedure.  ** If you are going home the same day as your procedure, someone must remain with you for the first 24 hours after your surgery if you receive anesthesia or sedation.  If you do not have someone to stay with you, your procedure may be

## 2024-10-04 ENCOUNTER — TELEPHONE (OUTPATIENT)
Dept: PAIN MANAGEMENT | Age: 39
End: 2024-10-04

## 2024-10-04 ENCOUNTER — HOSPITAL ENCOUNTER (OUTPATIENT)
Dept: PREADMISSION TESTING | Age: 39
Discharge: HOME | End: 2024-10-08
Payer: COMMERCIAL

## 2024-10-04 ENCOUNTER — TELEPHONE (OUTPATIENT)
Dept: PRIMARY CARE CLINIC | Age: 39
End: 2024-10-04

## 2024-10-04 VITALS
SYSTOLIC BLOOD PRESSURE: 150 MMHG | HEART RATE: 87 BPM | HEIGHT: 69 IN | DIASTOLIC BLOOD PRESSURE: 97 MMHG | WEIGHT: 281 LBS | TEMPERATURE: 96.5 F | OXYGEN SATURATION: 95 % | BODY MASS INDEX: 41.62 KG/M2 | RESPIRATION RATE: 18 BRPM

## 2024-10-04 LAB
ABO + RH BLD: NORMAL
ANION GAP SERPL CALCULATED.3IONS-SCNC: 12 MMOL/L (ref 9–16)
ARM BAND NUMBER: NORMAL
BILIRUB UR QL STRIP: NEGATIVE
BLOOD BANK SAMPLE EXPIRATION: NORMAL
BLOOD GROUP ANTIBODIES SERPL: NEGATIVE
BUN SERPL-MCNC: 12 MG/DL (ref 6–20)
CALCIUM SERPL-MCNC: 9.4 MG/DL (ref 8.6–10.4)
CHLORIDE SERPL-SCNC: 100 MMOL/L (ref 98–107)
CLARITY UR: CLEAR
CO2 SERPL-SCNC: 27 MMOL/L (ref 20–31)
COLOR UR: YELLOW
COMMENT: ABNORMAL
CREAT SERPL-MCNC: 1 MG/DL (ref 0.7–1.2)
ERYTHROCYTE [DISTWIDTH] IN BLOOD BY AUTOMATED COUNT: 13.1 % (ref 11.8–14.4)
GFR, ESTIMATED: >90 ML/MIN/1.73M2
GLUCOSE SERPL-MCNC: 191 MG/DL (ref 74–99)
GLUCOSE UR STRIP-MCNC: NEGATIVE MG/DL
HCT VFR BLD AUTO: 45.8 % (ref 40.7–50.3)
HGB BLD-MCNC: 15.8 G/DL (ref 13–17)
HGB UR QL STRIP.AUTO: NEGATIVE
KETONES UR STRIP-MCNC: NEGATIVE MG/DL
LEUKOCYTE ESTERASE UR QL STRIP: NEGATIVE
MCH RBC QN AUTO: 30.7 PG (ref 25.2–33.5)
MCHC RBC AUTO-ENTMCNC: 34.5 G/DL (ref 28.4–34.8)
MCV RBC AUTO: 89.1 FL (ref 82.6–102.9)
NITRITE UR QL STRIP: NEGATIVE
NRBC BLD-RTO: 0 PER 100 WBC
PH UR STRIP: 8.5 [PH] (ref 5–8)
PLATELET # BLD AUTO: 251 K/UL (ref 138–453)
PMV BLD AUTO: 10 FL (ref 8.1–13.5)
POTASSIUM SERPL-SCNC: 3.8 MMOL/L (ref 3.7–5.3)
PROT UR STRIP-MCNC: NEGATIVE MG/DL
RBC # BLD AUTO: 5.14 M/UL (ref 4.21–5.77)
SODIUM SERPL-SCNC: 139 MMOL/L (ref 136–145)
SP GR UR STRIP: 1.02 (ref 1–1.03)
UROBILINOGEN UR STRIP-ACNC: NORMAL EU/DL (ref 0–1)
WBC OTHER # BLD: 9.3 K/UL (ref 3.5–11.3)

## 2024-10-04 PROCEDURE — 85027 COMPLETE CBC AUTOMATED: CPT

## 2024-10-04 PROCEDURE — 36415 COLL VENOUS BLD VENIPUNCTURE: CPT

## 2024-10-04 PROCEDURE — 86900 BLOOD TYPING SEROLOGIC ABO: CPT

## 2024-10-04 PROCEDURE — 81003 URINALYSIS AUTO W/O SCOPE: CPT

## 2024-10-04 PROCEDURE — 86850 RBC ANTIBODY SCREEN: CPT

## 2024-10-04 PROCEDURE — 80048 BASIC METABOLIC PNL TOTAL CA: CPT

## 2024-10-04 PROCEDURE — 86901 BLOOD TYPING SEROLOGIC RH(D): CPT

## 2024-10-04 ASSESSMENT — PAIN SCALES - GENERAL: PAINLEVEL_OUTOF10: 5

## 2024-10-04 ASSESSMENT — PAIN DESCRIPTION - PAIN TYPE: TYPE: CHRONIC PAIN

## 2024-10-04 ASSESSMENT — PAIN DESCRIPTION - FREQUENCY: FREQUENCY: INTERMITTENT

## 2024-10-04 ASSESSMENT — PAIN DESCRIPTION - LOCATION: LOCATION: NECK

## 2024-10-04 NOTE — PROGRESS NOTES
Anesthesia Focused Assessment      STOP-BANG Sleep Apnea Questionnaire    SNORE loudly (heard through closed doors)?   No  TIRED, fatigued, sleepy during daytime?    No  OBSERVED stopping breathing during sleep?   No  High blood PRESSURE being treated?    Yes    BMI over 35?        Yes  AGE over 50?        No  NECK circumference over 16\"?     No  GENDER (male)?       Yes             Total 3  High risk 5-8  Intermediate risk 3-4  Low risk 0-2    Obstructive Sleep Apnea: denies  If YES, machine used: no     Type 1 DM:   no  T2DM:  no    Coronary Artery Disease:  no  Hypertension:  yes    Active smoker:  no  Drinks alcohol:  weekly  Recreational drugs: no    Dentition: benign    Defib / AICD / Pacemaker: no      Renal Failure/dialysis:  no    Patient was evaluated in PAT & anesthesia guidelines were applied.   NPO guidelines, medication instructions and scheduled arrival time were reviewed with patient.  I advised patient to please contact the surgeon's office, ahead of time if possible, if any new signs or symptoms of illness, infection, rash, etc    Hx of anesthesia complications:  no  Family hx of anesthesia complications:  no                                                                                                                     Patient is overall active, denies any cardiac or pulmonary complaints.  He had a echo ordered by PCP Dr. Burton, which patient has not yet completed as he hasn't heard back from the office.  Will reach out to PCP to see if she wants this completed prior to surgery.    JOSE HERNANDEZ PA-C  10/4/24  1:25 PM

## 2024-10-04 NOTE — TELEPHONE ENCOUNTER
Last appt 09/25/24  Next appt 10/11/24    Cecy from  Neuro Surgery called stating pt is scheduled or surgery on 10/18/24 and there is an order for an Echo for pt  in chart.    Anesthesiologist is asking if Echo needs to be done prior to 10/18/24.    Per PCP, Yes Echo needs to be done prior to surgery.    Cecy will contact pt to advise and follow up on Monday with pt if does not reach him.

## 2024-10-04 NOTE — TELEPHONE ENCOUNTER
Berny to cancel his procedure on 10/08/2024 patient is having back surgery and was advised not to have this done. Please advise

## 2024-10-04 NOTE — H&P (VIEW-ONLY)
History and Physical    Pt Name: Berny Hoyos  MRN: [id number]  YOB: 1985  Date of evaluation: 10/4/2024    SUBJECTIVE:   History of Chief Complaint:    Patient presents for PAT appointment.  He reports neck pain, RUE symptoms (pain, numbness/tingling).  He says that symptoms began several months ago and have persisted.  He has not been able to work as a result.  He has failed conservative treatment.  He has been scheduled for C6-C7 ARTHROPLASTY.   Past Medical History    has a past medical history of Cervical spondylolysis, Chronic infection of both ears, Class 3 severe obesity due to excess calories in adult, COVID, Diverticulitis, Dyslipidemia, Hypertension, Lumbar strain, Prediabetes, Snores, Under care of team, and Under care of team.  Past Surgical History   has a past surgical history that includes Appendectomy; Tympanostomy tube placement; and [place] tooth extraction.  Medications  Prior to Admission medications    Medication Sig Start Date End Date Taking? Authorizing Provider   metFORMIN (GLUCOPHAGE) 500 MG tablet Take 1 tablet daily with supper for 7 days, THEN 1 tablet 2 times daily (with meals) for 7 days, THEN 1 tablet with breakfast and 2 tablets with supper for 7 days, THEN 2 tablets daily (with meals). 9/26/24  Yes Richelle Burton MD   cyclobenzaprine (FLEXERIL) 10 MG tablet Take 1 tablet by mouth 3 times daily as needed for Muscle spasms 9/12/24 10/12/24 Yes Tigre Albright MD   gabapentin (NEURONTIN) 400 MG capsule Take 1 capsule by mouth 3 times daily for 90 days. Intended supply: 30 days 9/12/24 12/11/24 Yes Tigre Albright MD   metoprolol tartrate (LOPRESSOR) 50 MG tablet Take 1 tablet by mouth 2 times daily 9/9/24  Yes Tigre Albright MD   hydroCHLOROthiazide (HYDRODIURIL) 25 MG tablet Take 1 tablet by mouth every morning 5/20/24  Yes Tigre Albright MD     Allergies  is allergic to amlodipine, calcium channel blockers, and lisinopril.  Family History  family history includes Heart Attack

## 2024-10-08 ENCOUNTER — HOSPITAL ENCOUNTER (OUTPATIENT)
Dept: PAIN MANAGEMENT | Facility: CLINIC | Age: 39
Discharge: HOME | End: 2024-10-08

## 2024-10-08 NOTE — DISCHARGE INSTRUCTIONS

## 2024-10-08 NOTE — FLOWSHEET NOTE
Patient had called the office to cancel appointment as he is having back surgery and was advised not to have this procedure done.

## 2024-10-10 ENCOUNTER — HOSPITAL ENCOUNTER (OUTPATIENT)
Age: 39
Discharge: HOME | End: 2024-10-12
Attending: STUDENT IN AN ORGANIZED HEALTH CARE EDUCATION/TRAINING PROGRAM
Payer: COMMERCIAL

## 2024-10-10 VITALS — BODY MASS INDEX: 41.62 KG/M2 | HEIGHT: 69 IN | WEIGHT: 281 LBS

## 2024-10-10 DIAGNOSIS — I10 PRIMARY HYPERTENSION: ICD-10-CM

## 2024-10-10 DIAGNOSIS — R60.0 LOWER EXTREMITY EDEMA: ICD-10-CM

## 2024-10-10 LAB
ECHO AO ROOT DIAM: 3.1 CM
ECHO AO ROOT INDEX: 1.3 CM/M2
ECHO AV AREA PEAK VELOCITY: 2.4 CM2
ECHO AV AREA VTI: 2.5 CM2
ECHO AV AREA/BSA PEAK VELOCITY: 1 CM2/M2
ECHO AV AREA/BSA VTI: 1 CM2/M2
ECHO AV MEAN GRADIENT: 4 MMHG
ECHO AV MEAN VELOCITY: 0.9 M/S
ECHO AV PEAK GRADIENT: 8 MMHG
ECHO AV PEAK VELOCITY: 1.4 M/S
ECHO AV VELOCITY RATIO: 0.71
ECHO AV VTI: 23.9 CM
ECHO BSA: 2.49 M2
ECHO EST RA PRESSURE: 3 MMHG
ECHO LA AREA 2C: 12.1 CM2
ECHO LA AREA 4C: 13 CM2
ECHO LA DIAMETER INDEX: 1.51 CM/M2
ECHO LA DIAMETER: 3.6 CM
ECHO LA MAJOR AXIS: 4.9 CM
ECHO LA MINOR AXIS: 4.6 CM
ECHO LA TO AORTIC ROOT RATIO: 1.16
ECHO LA VOL BP: 29 ML (ref 18–58)
ECHO LA VOL MOD A2C: 27 ML (ref 18–58)
ECHO LA VOL MOD A4C: 29 ML (ref 18–58)
ECHO LA VOL/BSA BIPLANE: 12 ML/M2 (ref 16–34)
ECHO LA VOLUME INDEX MOD A2C: 11 ML/M2 (ref 16–34)
ECHO LA VOLUME INDEX MOD A4C: 12 ML/M2 (ref 16–34)
ECHO LV E' LATERAL VELOCITY: 15.2 CM/S
ECHO LV E' SEPTAL VELOCITY: 9 CM/S
ECHO LV EDV A2C: 64 ML
ECHO LV EDV A4C: 51 ML
ECHO LV EDV INDEX A4C: 21 ML/M2
ECHO LV EDV NDEX A2C: 27 ML/M2
ECHO LV EJECTION FRACTION A2C: 61 %
ECHO LV EJECTION FRACTION A4C: 53 %
ECHO LV EJECTION FRACTION BIPLANE: 56 % (ref 55–100)
ECHO LV ESV A2C: 25 ML
ECHO LV ESV A4C: 24 ML
ECHO LV ESV INDEX A2C: 10 ML/M2
ECHO LV ESV INDEX A4C: 10 ML/M2
ECHO LV FRACTIONAL SHORTENING: 22 % (ref 28–44)
ECHO LV INTERNAL DIMENSION DIASTOLE INDEX: 1.72 CM/M2
ECHO LV INTERNAL DIMENSION DIASTOLIC: 4.1 CM (ref 4.2–5.9)
ECHO LV INTERNAL DIMENSION SYSTOLIC INDEX: 1.34 CM/M2
ECHO LV INTERNAL DIMENSION SYSTOLIC: 3.2 CM
ECHO LV IVSD: 1.3 CM (ref 0.6–1)
ECHO LV MASS 2D: 171.7 G (ref 88–224)
ECHO LV MASS INDEX 2D: 71.9 G/M2 (ref 49–115)
ECHO LV POSTERIOR WALL DIASTOLIC: 1.1 CM (ref 0.6–1)
ECHO LV RELATIVE WALL THICKNESS RATIO: 0.54
ECHO LVOT AREA: 3.5 CM2
ECHO LVOT AV VTI INDEX: 0.73
ECHO LVOT DIAM: 2.1 CM
ECHO LVOT MEAN GRADIENT: 2 MMHG
ECHO LVOT PEAK GRADIENT: 4 MMHG
ECHO LVOT PEAK VELOCITY: 1 M/S
ECHO LVOT STROKE VOLUME INDEX: 25.2 ML/M2
ECHO LVOT SV: 60.2 ML
ECHO LVOT VTI: 17.4 CM
ECHO MV A VELOCITY: 0.57 M/S
ECHO MV AREA VTI: 3.1 CM2
ECHO MV E DECELERATION TIME (DT): 199 MS
ECHO MV E VELOCITY: 0.69 M/S
ECHO MV E/A RATIO: 1.21
ECHO MV E/E' LATERAL: 4.54
ECHO MV E/E' RATIO (AVERAGED): 6.1
ECHO MV E/E' SEPTAL: 7.67
ECHO MV LVOT VTI INDEX: 1.11
ECHO MV MAX VELOCITY: 0.9 M/S
ECHO MV MEAN GRADIENT: 1 MMHG
ECHO MV MEAN VELOCITY: 0.5 M/S
ECHO MV PEAK GRADIENT: 3 MMHG
ECHO MV VTI: 19.4 CM
ECHO RIGHT VENTRICULAR SYSTOLIC PRESSURE (RVSP): 18 MMHG
ECHO RV BASAL DIMENSION: 3 CM
ECHO RV FREE WALL PEAK S': 13.6 CM/S
ECHO RV TAPSE: 1.9 CM (ref 1.7–?)
ECHO TV REGURGITANT MAX VELOCITY: 1.92 M/S
ECHO TV REGURGITANT PEAK GRADIENT: 15 MMHG

## 2024-10-10 PROCEDURE — 93306 TTE W/DOPPLER COMPLETE: CPT

## 2024-10-10 NOTE — PATIENT INSTRUCTIONS
Thank you for coming in today. It was a pleasure to see you!     Our plan is the following:  I've ordered some labs that you can have done on your way out or at your earliest convenience. These do not need to be done while fasting, but they need to be completed early in the morning (around 8a).  You have [employer], so you'll be able to see your results once they come out. If there are any abnormalities, you'll receive a message from me on [employer] or you'll get a phone call from the office to talk about next steps.        Your medication list is included in the after visit summary. If you find any differences when compared to your medications at home, please contact the office ([phone]) to let us know. You can call the office and speak to one of the staff, or send a message via [employer] if you have any additional questions or concerns.     Have a great rest of your day!

## 2024-10-10 NOTE — PROGRESS NOTES
history, current medications, allergies, surgical history, family history and social history.  Updates were made as necessary.  Allergies   Allergen Reactions    Amlodipine Dermatitis     \"Boils on my feet.\"    Calcium Channel Blockers Dermatitis     \"Boils on my feet.\"    Lisinopril Dermatitis     \"Boils on my feet.\"       Patient Active Problem List   Diagnosis    Primary hypertension    Morbid obesity    Pre-diabetes    Dyslipidemia    Chronic sinusitis    Cervical stenosis of spinal canal    Lower extremity edema    Never smoked tobacco    Need for assessment for sleep apnea       Past Medical History:   Diagnosis Date    Cervical spondylolysis 2024    Chronic infection of both ears     Class 3 severe obesity due to excess calories in adult 09/25/2024    COVID 09/2021    Sore throat, cough, ear congestion,night chills, severe headache    Diverticulitis 04/30/2024    ER visit    Dyslipidemia     Hypertension     Lumbar strain 10/2019    Prediabetes 09/25/2024    Snores 10/04/2024    Under care of team     PCP - Dr. Burton - last visit 9/25/2024    Under care of team     Neurosurgery - Dr. Monzon - last visit       Past Surgical History:   Procedure Laterality Date    APPENDECTOMY      TYMPANOSTOMY TUBE PLACEMENT      WISDOM TOOTH EXTRACTION          Social History     Socioeconomic History    Marital status:      Spouse name: None    Number of children: None    Years of education: None    Highest education level: None   Tobacco Use    Smoking status: Never    Smokeless tobacco: Never   Vaping Use    Vaping status: Never Used   Substance and Sexual Activity    Alcohol use: Not Currently     Comment: \"Maybe a 12 pack a week.\"    Sexual activity: Yes     Partners: Female     Social Determinants of Health     Financial Resource Strain: Low Risk  (12/12/2023)    Overall Financial Resource Strain (CARDIA)     Difficulty of Paying Living Expenses: Not hard at all   Food Insecurity: No Food Insecurity (5/18/2024)

## 2024-10-11 ENCOUNTER — OFFICE VISIT (OUTPATIENT)
Dept: PRIMARY CARE CLINIC | Age: 39
End: 2024-10-11

## 2024-10-11 ENCOUNTER — HOSPITAL ENCOUNTER (OUTPATIENT)
Age: 39
Setting detail: SPECIMEN
Discharge: HOME | End: 2024-10-11

## 2024-10-11 VITALS
BODY MASS INDEX: 41.71 KG/M2 | HEART RATE: 90 BPM | DIASTOLIC BLOOD PRESSURE: 88 MMHG | WEIGHT: 281.6 LBS | HEIGHT: 69 IN | SYSTOLIC BLOOD PRESSURE: 138 MMHG | OXYGEN SATURATION: 96 %

## 2024-10-11 DIAGNOSIS — E78.5 DYSLIPIDEMIA: ICD-10-CM

## 2024-10-11 DIAGNOSIS — I10 PRIMARY HYPERTENSION: Primary | ICD-10-CM

## 2024-10-11 DIAGNOSIS — Z01.89 NEED FOR ASSESSMENT FOR SLEEP APNEA: ICD-10-CM

## 2024-10-11 DIAGNOSIS — Z78.9 NEVER SMOKED TOBACCO: ICD-10-CM

## 2024-10-11 DIAGNOSIS — R60.0 LOWER EXTREMITY EDEMA: ICD-10-CM

## 2024-10-11 DIAGNOSIS — R53.83 OTHER FATIGUE: ICD-10-CM

## 2024-10-11 DIAGNOSIS — R22.42 SKIN LUMP OF LEG, LEFT: ICD-10-CM

## 2024-10-11 LAB
ALBUMIN SERPL-MCNC: 4.7 G/DL (ref 3.5–5.2)
ALBUMIN/GLOB SERPL: 1 {RATIO} (ref 1–2.5)
ALP SERPL-CCNC: 68 U/L (ref 40–129)
ALT SERPL-CCNC: 48 U/L (ref 10–50)
AST SERPL-CCNC: 36 U/L (ref 10–50)
BILIRUB DIRECT SERPL-MCNC: 0.1 MG/DL (ref 0–0.2)
BILIRUB INDIRECT SERPL-MCNC: 0.1 MG/DL (ref 0–1)
BILIRUB SERPL-MCNC: 0.2 MG/DL (ref 0–1.2)
FSH SERPL-ACNC: 2.4 MIU/ML (ref 1.5–12.4)
GLOBULIN SER CALC-MCNC: 3.3 G/DL
LH SERPL-ACNC: 4.4 MIU/ML (ref 1.7–8.6)
PARTIAL THROMBOPLASTIN TIME: 26.4 SEC (ref 23–36.5)
PROT SERPL-MCNC: 8 G/DL (ref 6.6–8.7)
SHBG SERPL-SCNC: 20 NMOL/L (ref 17–56)
TESTOST FREE MFR SERPL: 56.3 PG/ML (ref 47–244)
TESTOST SERPL-MCNC: 226 NG/DL (ref 249–836)
TSH SERPL DL<=0.05 MIU/L-ACNC: 2 UIU/ML (ref 0.27–4.2)

## 2024-10-11 ASSESSMENT — PATIENT HEALTH QUESTIONNAIRE - PHQ9
SUM OF ALL RESPONSES TO PHQ QUESTIONS 1-9: 0
1. LITTLE INTEREST OR PLEASURE IN DOING THINGS: NOT AT ALL
SUM OF ALL RESPONSES TO PHQ QUESTIONS 1-9: 0
2. FEELING DOWN, DEPRESSED OR HOPELESS: NOT AT ALL
SUM OF ALL RESPONSES TO PHQ QUESTIONS 1-9: 0
SUM OF ALL RESPONSES TO PHQ9 QUESTIONS 1 & 2: 0
SUM OF ALL RESPONSES TO PHQ QUESTIONS 1-9: 0

## 2024-10-11 NOTE — ASSESSMENT & PLAN NOTE
-labs ordered to help determine etiology  -no GABRIELA today- pt encouraged to schedule appt if GABRIELA returns

## 2024-10-17 ENCOUNTER — ANESTHESIA EVENT (OUTPATIENT)
Dept: OPERATING ROOM | Age: 39
End: 2024-10-17
Payer: COMMERCIAL

## 2024-10-18 ENCOUNTER — HOSPITAL ENCOUNTER (INPATIENT)
Age: 39
LOS: 1 days | Discharge: HOME | DRG: 518 | End: 2024-10-19
Attending: NEUROLOGICAL SURGERY | Admitting: NEUROLOGICAL SURGERY
Payer: COMMERCIAL

## 2024-10-18 ENCOUNTER — ANESTHESIA (OUTPATIENT)
Dept: OPERATING ROOM | Age: 39
End: 2024-10-18
Payer: COMMERCIAL

## 2024-10-18 ENCOUNTER — APPOINTMENT (OUTPATIENT)
Dept: GENERAL RADIOLOGY | Age: 39
DRG: 518 | End: 2024-10-18
Attending: NEUROLOGICAL SURGERY
Payer: COMMERCIAL

## 2024-10-18 DIAGNOSIS — M47.22 CERVICAL SPONDYLOSIS WITH RADICULOPATHY: Primary | ICD-10-CM

## 2024-10-18 LAB
GLUCOSE BLD-MCNC: 102 MG/DL (ref 75–110)
GLUCOSE BLD-MCNC: 147 MG/DL (ref 75–110)
GLUCOSE BLD-MCNC: 170 MG/DL (ref 75–110)
GLUCOSE BLD-MCNC: 204 MG/DL (ref 75–110)

## 2024-10-18 PROCEDURE — 6360000002 HC RX W HCPCS: Performed by: NEUROLOGICAL SURGERY

## 2024-10-18 PROCEDURE — 3600000014 HC SURGERY LEVEL 4 ADDTL 15MIN: Performed by: NEUROLOGICAL SURGERY

## 2024-10-18 PROCEDURE — 6370000000 HC RX 637 (ALT 250 FOR IP): Performed by: PHYSICIAN ASSISTANT

## 2024-10-18 PROCEDURE — 2060000000 HC ICU INTERMEDIATE R&B

## 2024-10-18 PROCEDURE — C1713 ANCHOR/SCREW BN/BN,TIS/BN: HCPCS | Performed by: NEUROLOGICAL SURGERY

## 2024-10-18 PROCEDURE — 2700000000 HC OXYGEN THERAPY PER DAY

## 2024-10-18 PROCEDURE — 6370000000 HC RX 637 (ALT 250 FOR IP): Performed by: NEUROLOGICAL SURGERY

## 2024-10-18 PROCEDURE — 97161 PT EVAL LOW COMPLEX 20 MIN: CPT

## 2024-10-18 PROCEDURE — 22856 TOT DISC ARTHRP 1NTRSPC CRV: CPT | Performed by: PHYSICIAN ASSISTANT

## 2024-10-18 PROCEDURE — 2720000010 HC SURG SUPPLY STERILE: Performed by: NEUROLOGICAL SURGERY

## 2024-10-18 PROCEDURE — 7100000001 HC PACU RECOVERY - ADDTL 15 MIN: Performed by: NEUROLOGICAL SURGERY

## 2024-10-18 PROCEDURE — 2500000003 HC RX 250 WO HCPCS

## 2024-10-18 PROCEDURE — 2580000003 HC RX 258: Performed by: ANESTHESIOLOGY

## 2024-10-18 PROCEDURE — 6360000002 HC RX W HCPCS: Performed by: ANESTHESIOLOGY

## 2024-10-18 PROCEDURE — 94761 N-INVAS EAR/PLS OXIMETRY MLT: CPT

## 2024-10-18 PROCEDURE — 2709999900 HC NON-CHARGEABLE SUPPLY: Performed by: NEUROLOGICAL SURGERY

## 2024-10-18 PROCEDURE — 0RB30ZZ EXCISION OF CERVICAL VERTEBRAL DISC, OPEN APPROACH: ICD-10-PCS | Performed by: NEUROLOGICAL SURGERY

## 2024-10-18 PROCEDURE — 6360000002 HC RX W HCPCS: Performed by: PHYSICIAN ASSISTANT

## 2024-10-18 PROCEDURE — 2580000003 HC RX 258: Performed by: NEUROLOGICAL SURGERY

## 2024-10-18 PROCEDURE — 22856 TOT DISC ARTHRP 1NTRSPC CRV: CPT | Performed by: NEUROLOGICAL SURGERY

## 2024-10-18 PROCEDURE — 6360000002 HC RX W HCPCS

## 2024-10-18 PROCEDURE — 97530 THERAPEUTIC ACTIVITIES: CPT

## 2024-10-18 PROCEDURE — 0RR30JZ REPLACEMENT OF CERVICAL VERTEBRAL DISC WITH SYNTHETIC SUBSTITUTE, OPEN APPROACH: ICD-10-PCS | Performed by: NEUROLOGICAL SURGERY

## 2024-10-18 PROCEDURE — 7100000000 HC PACU RECOVERY - FIRST 15 MIN: Performed by: NEUROLOGICAL SURGERY

## 2024-10-18 PROCEDURE — 2500000003 HC RX 250 WO HCPCS: Performed by: NEUROLOGICAL SURGERY

## 2024-10-18 PROCEDURE — 3700000001 HC ADD 15 MINUTES (ANESTHESIA): Performed by: NEUROLOGICAL SURGERY

## 2024-10-18 PROCEDURE — 82947 ASSAY GLUCOSE BLOOD QUANT: CPT

## 2024-10-18 PROCEDURE — 3600000004 HC SURGERY LEVEL 4 BASE: Performed by: NEUROLOGICAL SURGERY

## 2024-10-18 PROCEDURE — 2580000003 HC RX 258: Performed by: PHYSICIAN ASSISTANT

## 2024-10-18 PROCEDURE — 3700000000 HC ANESTHESIA ATTENDED CARE: Performed by: NEUROLOGICAL SURGERY

## 2024-10-18 PROCEDURE — C1889 IMPLANT/INSERT DEVICE, NOC: HCPCS | Performed by: NEUROLOGICAL SURGERY

## 2024-10-18 DEVICE — PRESTIGE LP DISC 6X18MM
Type: IMPLANTABLE DEVICE | Site: ANTERIOR CERVICAL | Status: FUNCTIONAL
Brand: PRESTIGE® LP CERVICAL DISC SYSTEM

## 2024-10-18 RX ORDER — SODIUM CHLORIDE 0.9 % (FLUSH) 0.9 %
5-40 SYRINGE (ML) INJECTION PRN
Status: DISCONTINUED | OUTPATIENT
Start: 2024-10-18 | End: 2024-10-19 | Stop reason: HOSPADM

## 2024-10-18 RX ORDER — PROCHLORPERAZINE EDISYLATE 5 MG/ML
5 INJECTION INTRAMUSCULAR; INTRAVENOUS
Status: DISCONTINUED | OUTPATIENT
Start: 2024-10-18 | End: 2024-10-18 | Stop reason: HOSPADM

## 2024-10-18 RX ORDER — SODIUM CHLORIDE 9 MG/ML
INJECTION, SOLUTION INTRAVENOUS CONTINUOUS
Status: DISCONTINUED | OUTPATIENT
Start: 2024-10-18 | End: 2024-10-18

## 2024-10-18 RX ORDER — OXYCODONE HYDROCHLORIDE 5 MG/1
10 TABLET ORAL EVERY 4 HOURS PRN
Status: DISCONTINUED | OUTPATIENT
Start: 2024-10-18 | End: 2024-10-19 | Stop reason: HOSPADM

## 2024-10-18 RX ORDER — MIDAZOLAM HYDROCHLORIDE 1 MG/ML
INJECTION INTRAMUSCULAR; INTRAVENOUS
Status: DISCONTINUED | OUTPATIENT
Start: 2024-10-18 | End: 2024-10-18 | Stop reason: SDUPTHER

## 2024-10-18 RX ORDER — ONDANSETRON 2 MG/ML
4 INJECTION INTRAMUSCULAR; INTRAVENOUS EVERY 6 HOURS PRN
Status: DISCONTINUED | OUTPATIENT
Start: 2024-10-18 | End: 2024-10-19 | Stop reason: HOSPADM

## 2024-10-18 RX ORDER — MAGNESIUM SULFATE 1 G/100ML
INJECTION INTRAVENOUS
Status: DISCONTINUED | OUTPATIENT
Start: 2024-10-18 | End: 2024-10-18 | Stop reason: SDUPTHER

## 2024-10-18 RX ORDER — HYDRALAZINE HYDROCHLORIDE 20 MG/ML
10 INJECTION INTRAMUSCULAR; INTRAVENOUS
Status: DISCONTINUED | OUTPATIENT
Start: 2024-10-18 | End: 2024-10-18 | Stop reason: HOSPADM

## 2024-10-18 RX ORDER — CEFAZOLIN SODIUM 1 G/3ML
INJECTION, POWDER, FOR SOLUTION INTRAMUSCULAR; INTRAVENOUS
Status: DISCONTINUED | OUTPATIENT
Start: 2024-10-18 | End: 2024-10-18 | Stop reason: SDUPTHER

## 2024-10-18 RX ORDER — OXYCODONE HYDROCHLORIDE 5 MG/1
5 TABLET ORAL EVERY 4 HOURS PRN
Status: DISCONTINUED | OUTPATIENT
Start: 2024-10-18 | End: 2024-10-19 | Stop reason: HOSPADM

## 2024-10-18 RX ORDER — SODIUM CHLORIDE 0.9 % (FLUSH) 0.9 %
5-40 SYRINGE (ML) INJECTION PRN
Status: DISCONTINUED | OUTPATIENT
Start: 2024-10-18 | End: 2024-10-18 | Stop reason: HOSPADM

## 2024-10-18 RX ORDER — SODIUM CHLORIDE, SODIUM LACTATE, POTASSIUM CHLORIDE, CALCIUM CHLORIDE 600; 310; 30; 20 MG/100ML; MG/100ML; MG/100ML; MG/100ML
INJECTION, SOLUTION INTRAVENOUS CONTINUOUS
Status: DISCONTINUED | OUTPATIENT
Start: 2024-10-18 | End: 2024-10-18 | Stop reason: HOSPADM

## 2024-10-18 RX ORDER — MAGNESIUM HYDROXIDE 1200 MG/15ML
LIQUID ORAL CONTINUOUS PRN
Status: COMPLETED | OUTPATIENT
Start: 2024-10-18 | End: 2024-10-18

## 2024-10-18 RX ORDER — GLYCOPYRROLATE 0.2 MG/ML
INJECTION INTRAMUSCULAR; INTRAVENOUS
Status: DISCONTINUED | OUTPATIENT
Start: 2024-10-18 | End: 2024-10-18 | Stop reason: SDUPTHER

## 2024-10-18 RX ORDER — ONDANSETRON 4 MG/1
4 TABLET, ORALLY DISINTEGRATING ORAL EVERY 8 HOURS PRN
Status: DISCONTINUED | OUTPATIENT
Start: 2024-10-18 | End: 2024-10-19 | Stop reason: HOSPADM

## 2024-10-18 RX ORDER — FENTANYL CITRATE 50 UG/ML
INJECTION, SOLUTION INTRAMUSCULAR; INTRAVENOUS
Status: DISCONTINUED | OUTPATIENT
Start: 2024-10-18 | End: 2024-10-18 | Stop reason: SDUPTHER

## 2024-10-18 RX ORDER — BISACODYL 5 MG/1
5 TABLET, DELAYED RELEASE ORAL DAILY PRN
Status: DISCONTINUED | OUTPATIENT
Start: 2024-10-18 | End: 2024-10-19 | Stop reason: HOSPADM

## 2024-10-18 RX ORDER — FAMOTIDINE 20 MG/1
20 TABLET, FILM COATED ORAL 2 TIMES DAILY
Status: DISCONTINUED | OUTPATIENT
Start: 2024-10-18 | End: 2024-10-19 | Stop reason: HOSPADM

## 2024-10-18 RX ORDER — GABAPENTIN 400 MG/1
400 CAPSULE ORAL 3 TIMES DAILY
Status: DISCONTINUED | OUTPATIENT
Start: 2024-10-18 | End: 2024-10-19 | Stop reason: HOSPADM

## 2024-10-18 RX ORDER — LIDOCAINE HYDROCHLORIDE 10 MG/ML
INJECTION, SOLUTION EPIDURAL; INFILTRATION; INTRACAUDAL; PERINEURAL
Status: DISCONTINUED | OUTPATIENT
Start: 2024-10-18 | End: 2024-10-18 | Stop reason: SDUPTHER

## 2024-10-18 RX ORDER — ENOXAPARIN SODIUM 100 MG/ML
40 INJECTION SUBCUTANEOUS DAILY
Status: DISCONTINUED | OUTPATIENT
Start: 2024-10-19 | End: 2024-10-19 | Stop reason: HOSPADM

## 2024-10-18 RX ORDER — POLYETHYLENE GLYCOL 3350 17 G/17G
17 POWDER, FOR SOLUTION ORAL DAILY
Status: DISCONTINUED | OUTPATIENT
Start: 2024-10-18 | End: 2024-10-19 | Stop reason: HOSPADM

## 2024-10-18 RX ORDER — IBUPROFEN 400 MG/1
600 TABLET, FILM COATED ORAL EVERY 8 HOURS
Status: DISCONTINUED | OUTPATIENT
Start: 2024-10-18 | End: 2024-10-19 | Stop reason: HOSPADM

## 2024-10-18 RX ORDER — SODIUM CHLORIDE 0.9 % (FLUSH) 0.9 %
5-40 SYRINGE (ML) INJECTION EVERY 12 HOURS SCHEDULED
Status: DISCONTINUED | OUTPATIENT
Start: 2024-10-18 | End: 2024-10-18 | Stop reason: HOSPADM

## 2024-10-18 RX ORDER — PHENYLEPHRINE HCL IN 0.9% NACL 1 MG/10 ML
SYRINGE (ML) INTRAVENOUS
Status: DISCONTINUED | OUTPATIENT
Start: 2024-10-18 | End: 2024-10-18 | Stop reason: SDUPTHER

## 2024-10-18 RX ORDER — LABETALOL HYDROCHLORIDE 5 MG/ML
10 INJECTION, SOLUTION INTRAVENOUS
Status: DISCONTINUED | OUTPATIENT
Start: 2024-10-18 | End: 2024-10-18 | Stop reason: HOSPADM

## 2024-10-18 RX ORDER — PROPOFOL 10 MG/ML
INJECTION, EMULSION INTRAVENOUS
Status: DISCONTINUED | OUTPATIENT
Start: 2024-10-18 | End: 2024-10-18 | Stop reason: SDUPTHER

## 2024-10-18 RX ORDER — HYDROCHLOROTHIAZIDE 25 MG/1
25 TABLET ORAL EVERY MORNING
Status: DISCONTINUED | OUTPATIENT
Start: 2024-10-19 | End: 2024-10-19 | Stop reason: HOSPADM

## 2024-10-18 RX ORDER — SODIUM CHLORIDE 0.9 % (FLUSH) 0.9 %
5-40 SYRINGE (ML) INJECTION EVERY 12 HOURS SCHEDULED
Status: DISCONTINUED | OUTPATIENT
Start: 2024-10-18 | End: 2024-10-19 | Stop reason: HOSPADM

## 2024-10-18 RX ORDER — LIDOCAINE HYDROCHLORIDE AND EPINEPHRINE 10; 10 MG/ML; UG/ML
INJECTION, SOLUTION INFILTRATION; PERINEURAL PRN
Status: DISCONTINUED | OUTPATIENT
Start: 2024-10-18 | End: 2024-10-18 | Stop reason: ALTCHOICE

## 2024-10-18 RX ORDER — IPRATROPIUM BROMIDE AND ALBUTEROL SULFATE 2.5; .5 MG/3ML; MG/3ML
1 SOLUTION RESPIRATORY (INHALATION)
Status: DISCONTINUED | OUTPATIENT
Start: 2024-10-18 | End: 2024-10-18 | Stop reason: HOSPADM

## 2024-10-18 RX ORDER — SODIUM CHLORIDE 9 MG/ML
INJECTION, SOLUTION INTRAVENOUS PRN
Status: DISCONTINUED | OUTPATIENT
Start: 2024-10-18 | End: 2024-10-18 | Stop reason: HOSPADM

## 2024-10-18 RX ORDER — METHYLPREDNISOLONE ACETATE 40 MG/ML
INJECTION, SUSPENSION INTRA-ARTICULAR; INTRALESIONAL; INTRAMUSCULAR; SOFT TISSUE PRN
Status: DISCONTINUED | OUTPATIENT
Start: 2024-10-18 | End: 2024-10-18 | Stop reason: ALTCHOICE

## 2024-10-18 RX ORDER — SODIUM CHLORIDE 9 MG/ML
INJECTION, SOLUTION INTRAVENOUS PRN
Status: DISCONTINUED | OUTPATIENT
Start: 2024-10-18 | End: 2024-10-19 | Stop reason: HOSPADM

## 2024-10-18 RX ORDER — DEXAMETHASONE SODIUM PHOSPHATE 10 MG/ML
INJECTION, SOLUTION INTRAMUSCULAR; INTRAVENOUS
Status: DISCONTINUED | OUTPATIENT
Start: 2024-10-18 | End: 2024-10-18 | Stop reason: SDUPTHER

## 2024-10-18 RX ORDER — SENNOSIDES A AND B 8.6 MG/1
1 TABLET, FILM COATED ORAL DAILY PRN
Status: DISCONTINUED | OUTPATIENT
Start: 2024-10-18 | End: 2024-10-19 | Stop reason: HOSPADM

## 2024-10-18 RX ORDER — ACETAMINOPHEN 325 MG/1
650 TABLET ORAL EVERY 6 HOURS
Status: DISCONTINUED | OUTPATIENT
Start: 2024-10-18 | End: 2024-10-19 | Stop reason: HOSPADM

## 2024-10-18 RX ORDER — METOPROLOL TARTRATE 50 MG
50 TABLET ORAL 2 TIMES DAILY
Status: DISCONTINUED | OUTPATIENT
Start: 2024-10-18 | End: 2024-10-19 | Stop reason: HOSPADM

## 2024-10-18 RX ORDER — SUCCINYLCHOLINE/SOD CL,ISO/PF 100 MG/5ML
SYRINGE (ML) INTRAVENOUS
Status: DISCONTINUED | OUTPATIENT
Start: 2024-10-18 | End: 2024-10-18 | Stop reason: SDUPTHER

## 2024-10-18 RX ORDER — NALOXONE HYDROCHLORIDE 0.4 MG/ML
INJECTION, SOLUTION INTRAMUSCULAR; INTRAVENOUS; SUBCUTANEOUS PRN
Status: DISCONTINUED | OUTPATIENT
Start: 2024-10-18 | End: 2024-10-18 | Stop reason: HOSPADM

## 2024-10-18 RX ORDER — ONDANSETRON 2 MG/ML
INJECTION INTRAMUSCULAR; INTRAVENOUS
Status: DISCONTINUED | OUTPATIENT
Start: 2024-10-18 | End: 2024-10-18 | Stop reason: SDUPTHER

## 2024-10-18 RX ORDER — DIPHENHYDRAMINE HYDROCHLORIDE 50 MG/ML
12.5 INJECTION INTRAMUSCULAR; INTRAVENOUS
Status: DISCONTINUED | OUTPATIENT
Start: 2024-10-18 | End: 2024-10-18 | Stop reason: HOSPADM

## 2024-10-18 RX ORDER — ROCURONIUM BROMIDE 10 MG/ML
INJECTION, SOLUTION INTRAVENOUS
Status: DISCONTINUED | OUTPATIENT
Start: 2024-10-18 | End: 2024-10-18 | Stop reason: SDUPTHER

## 2024-10-18 RX ORDER — CYCLOBENZAPRINE HCL 10 MG
10 TABLET ORAL 3 TIMES DAILY PRN
Status: DISCONTINUED | OUTPATIENT
Start: 2024-10-18 | End: 2024-10-19 | Stop reason: HOSPADM

## 2024-10-18 RX ADMIN — ROCURONIUM BROMIDE 50 MG: 10 INJECTION, SOLUTION INTRAVENOUS at 07:44

## 2024-10-18 RX ADMIN — BENZOCAINE AND MENTHOL 1 LOZENGE: 15; 3.6 LOZENGE ORAL at 21:53

## 2024-10-18 RX ADMIN — MAGNESIUM SULFATE HEPTAHYDRATE 1000 MG: 1 INJECTION, SOLUTION INTRAVENOUS at 09:21

## 2024-10-18 RX ADMIN — METOPROLOL TARTRATE 50 MG: 50 TABLET, FILM COATED ORAL at 20:33

## 2024-10-18 RX ADMIN — SODIUM CHLORIDE, PRESERVATIVE FREE 10 ML: 5 INJECTION INTRAVENOUS at 20:37

## 2024-10-18 RX ADMIN — FAMOTIDINE 20 MG: 20 TABLET, FILM COATED ORAL at 20:33

## 2024-10-18 RX ADMIN — SODIUM CHLORIDE, POTASSIUM CHLORIDE, SODIUM LACTATE AND CALCIUM CHLORIDE: 600; 310; 30; 20 INJECTION, SOLUTION INTRAVENOUS at 06:35

## 2024-10-18 RX ADMIN — DEXAMETHASONE SODIUM PHOSPHATE 10 MG: 10 INJECTION INTRAMUSCULAR; INTRAVENOUS at 08:19

## 2024-10-18 RX ADMIN — HYDROMORPHONE HYDROCHLORIDE 0.5 MG: 1 INJECTION, SOLUTION INTRAMUSCULAR; INTRAVENOUS; SUBCUTANEOUS at 13:37

## 2024-10-18 RX ADMIN — ROCURONIUM BROMIDE 25 MG: 10 INJECTION, SOLUTION INTRAVENOUS at 08:39

## 2024-10-18 RX ADMIN — GABAPENTIN 400 MG: 400 CAPSULE ORAL at 20:33

## 2024-10-18 RX ADMIN — FENTANYL CITRATE 50 MCG: 50 INJECTION, SOLUTION INTRAMUSCULAR; INTRAVENOUS at 08:57

## 2024-10-18 RX ADMIN — BENZOCAINE AND MENTHOL 1 LOZENGE: 15; 3.6 LOZENGE ORAL at 19:23

## 2024-10-18 RX ADMIN — OXYCODONE 5 MG: 5 TABLET ORAL at 16:23

## 2024-10-18 RX ADMIN — CEFAZOLIN 3 G: 1 INJECTION, POWDER, FOR SOLUTION INTRAMUSCULAR; INTRAVENOUS at 07:50

## 2024-10-18 RX ADMIN — SUGAMMADEX 300 MG: 100 INJECTION, SOLUTION INTRAVENOUS at 10:30

## 2024-10-18 RX ADMIN — IBUPROFEN 600 MG: 400 TABLET, FILM COATED ORAL at 16:23

## 2024-10-18 RX ADMIN — HYDROMORPHONE HYDROCHLORIDE 0.5 MG: 1 INJECTION, SOLUTION INTRAMUSCULAR; INTRAVENOUS; SUBCUTANEOUS at 18:31

## 2024-10-18 RX ADMIN — OXYCODONE 5 MG: 5 TABLET ORAL at 20:34

## 2024-10-18 RX ADMIN — ROCURONIUM BROMIDE 25 MG: 10 INJECTION, SOLUTION INTRAVENOUS at 08:58

## 2024-10-18 RX ADMIN — ONDANSETRON 4 MG: 2 INJECTION INTRAMUSCULAR; INTRAVENOUS at 10:11

## 2024-10-18 RX ADMIN — HYDROMORPHONE HYDROCHLORIDE 1 MG: 1 INJECTION, SOLUTION INTRAMUSCULAR; INTRAVENOUS; SUBCUTANEOUS at 15:18

## 2024-10-18 RX ADMIN — FENTANYL CITRATE 50 MCG: 50 INJECTION, SOLUTION INTRAMUSCULAR; INTRAVENOUS at 10:19

## 2024-10-18 RX ADMIN — Medication 20 MG: at 09:28

## 2024-10-18 RX ADMIN — MAGNESIUM SULFATE HEPTAHYDRATE 1000 MG: 1 INJECTION, SOLUTION INTRAVENOUS at 09:00

## 2024-10-18 RX ADMIN — Medication 30 MG: at 08:44

## 2024-10-18 RX ADMIN — MIDAZOLAM 2 MG: 1 INJECTION INTRAMUSCULAR; INTRAVENOUS at 07:35

## 2024-10-18 RX ADMIN — SODIUM CHLORIDE, POTASSIUM CHLORIDE, SODIUM LACTATE AND CALCIUM CHLORIDE: 600; 310; 30; 20 INJECTION, SOLUTION INTRAVENOUS at 08:59

## 2024-10-18 RX ADMIN — PROPOFOL 300 MG: 10 INJECTION, EMULSION INTRAVENOUS at 07:39

## 2024-10-18 RX ADMIN — Medication 3000 MG: at 16:49

## 2024-10-18 RX ADMIN — HYDROMORPHONE HYDROCHLORIDE 0.5 MG: 1 INJECTION, SOLUTION INTRAMUSCULAR; INTRAVENOUS; SUBCUTANEOUS at 13:50

## 2024-10-18 RX ADMIN — Medication 180 MG: at 07:39

## 2024-10-18 RX ADMIN — Medication 100 MCG: at 10:09

## 2024-10-18 RX ADMIN — Medication 100 MCG: at 08:07

## 2024-10-18 RX ADMIN — ACETAMINOPHEN 650 MG: 325 TABLET ORAL at 16:23

## 2024-10-18 RX ADMIN — Medication 100 MCG: at 08:10

## 2024-10-18 RX ADMIN — FENTANYL CITRATE 100 MCG: 50 INJECTION, SOLUTION INTRAMUSCULAR; INTRAVENOUS at 07:39

## 2024-10-18 RX ADMIN — GABAPENTIN 400 MG: 400 CAPSULE ORAL at 16:23

## 2024-10-18 RX ADMIN — LIDOCAINE HYDROCHLORIDE 50 MG: 10 INJECTION, SOLUTION EPIDURAL; INFILTRATION; INTRACAUDAL; PERINEURAL at 07:39

## 2024-10-18 RX ADMIN — HYDROMORPHONE HYDROCHLORIDE 0.5 MG: 1 INJECTION, SOLUTION INTRAMUSCULAR; INTRAVENOUS; SUBCUTANEOUS at 10:38

## 2024-10-18 RX ADMIN — ROCURONIUM BROMIDE 20 MG: 10 INJECTION, SOLUTION INTRAVENOUS at 09:30

## 2024-10-18 RX ADMIN — GLYCOPYRROLATE 0.2 MG: 0.2 INJECTION INTRAMUSCULAR; INTRAVENOUS at 07:35

## 2024-10-18 RX ADMIN — ACETAMINOPHEN 650 MG: 325 TABLET ORAL at 20:33

## 2024-10-18 ASSESSMENT — PAIN SCALES - GENERAL
PAINLEVEL_OUTOF10: 3
PAINLEVEL_OUTOF10: 2
PAINLEVEL_OUTOF10: 4
PAINLEVEL_OUTOF10: 8
PAINLEVEL_OUTOF10: 2
PAINLEVEL_OUTOF10: 5
PAINLEVEL_OUTOF10: 7
PAINLEVEL_OUTOF10: 5
PAINLEVEL_OUTOF10: 3
PAINLEVEL_OUTOF10: 5
PAINLEVEL_OUTOF10: 5

## 2024-10-18 ASSESSMENT — PAIN DESCRIPTION - LOCATION
LOCATION: NECK
LOCATION: NECK
LOCATION: THROAT
LOCATION: THROAT
LOCATION: NECK

## 2024-10-18 ASSESSMENT — PAIN DESCRIPTION - DESCRIPTORS
DESCRIPTORS: ACHING

## 2024-10-18 ASSESSMENT — PAIN DESCRIPTION - ORIENTATION
ORIENTATION: RIGHT
ORIENTATION: RIGHT

## 2024-10-18 ASSESSMENT — PAIN - FUNCTIONAL ASSESSMENT
PAIN_FUNCTIONAL_ASSESSMENT: 0-10
PAIN_FUNCTIONAL_ASSESSMENT: ACTIVITIES ARE NOT PREVENTED

## 2024-10-18 NOTE — PROGRESS NOTES
Neurosurgery Post op Progress Note      SUBJECTIVE: Status post: 1.  C6-7 arthroplasty.    Patient seen while in recovery.  No cervical collar is in place.  Patient is extremely sleepy.  Able answer most questions posed to him.  He is able to move both upper and lower extremities on command.  He does state that the numbness and tingling he had associated to his right hand has gotten significantly better after surgery than prior to surgery.  He is able to discriminate which finger I am touching postoperatively, to the right hand.  He denies the presence of any new numbness, tingling, burning, paresthesia type pains to either upper or lower extremities.  He denies any headache, nausea or emesis.      OBJECTIVE      Physical exam   VITALS:    Vitals:    10/18/24 1045   BP: (!) 152/104   Pulse: 94   Resp: 18   Temp:    SpO2: 95%     INTAKE:    Intake/Output Summary (Last 24 hours) at 10/18/2024 1100  Last data filed at 10/18/2024 1039  Gross per 24 hour   Intake 1400 ml   Output 50 ml   Net 1350 ml     URINARY CATHETER OUTPUT (Beauchamp): No Beauchamp catheter.     DRAIN/TUBE OUTPUT: No drains to the surgical site.   No evidence of DVT seen on physical exam.    Neurological exam reveals Responds to voice and Responds to tactile stimuli  moves all extremities well, no involuntary movements, and reflexes at knee and ankle intact  cranial nerves II through XII intact, normal muscle tone, no tremors, strength 5/5.   the upper and lower extremities normal 5/5 strength in all tested muscle groups, no muscle wasting or atrophy, no fasciculations noted, no involuntary movements, no abnormalities of position, and normal resting muscle tone  normal light touch sensation      Wound   Post op wound:  anterior cervical spine   well approximated incision clean, dry, and no drainage Closed w/ subcuticular Monocryl and Dermabond.  No dressing overlying incision site.    Data      LABS:   Lab Results   Component Value Date    WBC 9.3 10/04/2024     HGB 15.8 10/04/2024    HCT 45.8 10/04/2024    MCV 89.1 10/04/2024     10/04/2024      Lab Results   Component Value Date     10/04/2024    K 3.8 10/04/2024     10/04/2024    CO2 27 10/04/2024     Lab Results   Component Value Date    BUN 12 10/04/2024      Lab Results   Component Value Date    CREATININE 1.0 10/04/2024        ASSESSMENT AND PLAN  1.  Okay to transfer patient from recovery to floor if stable.  2.  IV antibiotics for 24 hours and appropriate analgesics for pain.  3.  Consultations to both physical therapy and Occupational Therapy.  4.  Aspiration precautions.  5.  Okay to start nonsteroidal anti-inflammatories and promote neck range of motion.  6.  Will obtain radiographs of the cervical spine tomorrow: Upright AP/lateral, with flexion-extension views.  7.  We will start chemical DVT prophylaxis, i.e. Lovenox postop day 1.  For now, SCDs.  8.  Monitor blood sugars closely.  Patient is prediabetic.  9.  We will continue to closely follow this patient while he remains in house.

## 2024-10-18 NOTE — OP NOTE
Operative Note      Patient: Berny Hoyos  YOB: 1985  MRN: [id number]    Date of Procedure: 10/18/2024    Pre-Op Diagnosis Codes:      * Cervical spondylosis with radiculopathy [M47.22]    Post-Op Diagnosis: Same    Indications for procedure.  Patient with severe right-sided C7 radiculopathy.  Had conservative measures including physical therapy epidural injection and steroid taper which did not succeed.  MRI and clinical presentation consistent with right-sided C7 radiculopathy related to disc herniation.  Discussion had regarding the risks and benefits of the procedure and he was consented for arthroplasty C6-C7.       Procedure  Anterior cervical discectomy osteophytectomy and foraminotomy on the right side at C6-C7 along with arthroplasty implantation    Surgeon(s):  Tabitha Adkins DO    Assistant:   Physician Assistant: Dami Erickson PA-C    Anesthesia: General    Estimated Blood Loss (mL): less than 50     Complications: None    Specimens:   * No specimens in log *    Implants:  Implant Name Type Inv. Item Serial No.  Lot No. LRB No. Used Action   DISC CERV H6MM [id number] TI [employer] PRESTIGE LP - [id number]  DISC CERV H6MM [id number] TI [employer] LP  [facility] [id number] Right 1 Implanted         Drains: * No LDAs found *    Findings:  Infection Present At Time Of Surgery (PATOS) (choose all levels that have infection present):  No infection present  Other Findings: none    Detailed Description of Procedure:   The patient was consented preoperatively and brought into the operating room.  She was brought into the OR and placed on the regular table.  She was placed supine with all pressure points padded are slight shoulder was provided and the shoulders taped caudad.  Right paramedian region was cleansed and incision was marked out along the neck crease.  After sterile prepping draping timeouts performed fluoroscopy used to identify the incision in

## 2024-10-18 NOTE — ANESTHESIA PRE PROCEDURE
Department of Anesthesiology  Preprocedure Note       Name:  Berny Hoyos   Age:  39 y.o.  :  1985                                          MRN:  [id number]         Date:  10/18/2024      Surgeon: Surgeon(s):  Tabitha Adkins DO    Procedure: Procedure(s):  C6-C7 ARTHROPLASTY (SUGITA, SUPINE, C-ARM, MICROSCOPE, MEDTRONIC) *SHORT STAY*    Medications prior to admission:   Prior to Admission medications    Medication Sig Start Date End Date Taking? Authorizing Provider   metFORMIN (GLUCOPHAGE) 500 MG tablet Take 1 tablet daily with supper for 7 days, THEN 1 tablet 2 times daily (with meals) for 7 days, THEN 1 tablet with breakfast and 2 tablets with supper for 7 days, THEN 2 tablets daily (with meals). 24  Yes Richelle Burton MD   gabapentin (NEURONTIN) 400 MG capsule Take 1 capsule by mouth 3 times daily for 90 days. Intended supply: 30 days 24 Yes Tigre Albright MD   metoprolol tartrate (LOPRESSOR) 50 MG tablet Take 1 tablet by mouth 2 times daily 24  Yes Tigre Albright MD   hydroCHLOROthiazide (HYDRODIURIL) 25 MG tablet Take 1 tablet by mouth every morning 24  Yes Tigre Albright MD       Current medications:    Current Facility-Administered Medications   Medication Dose Route Frequency Provider Last Rate Last Admin   • lactated ringers IV soln infusion SOLN   IntraVENous Continuous Arnav Walters MD 50 mL/hr at 10/18/24 0635 New Bag at 10/18/24 0635       Allergies:    Allergies   Allergen Reactions   • Amlodipine Dermatitis     \"Boils on my feet.\"   • Calcium Channel Blockers Dermatitis     \"Boils on my feet.\"   • Lisinopril Dermatitis     \"Boils on my feet.\"       Problem List:    Patient Active Problem List   Diagnosis Code   • Primary hypertension I10   • Morbid obesity E66.01   • Pre-diabetes R73.03   • Dyslipidemia E78.5   • Chronic sinusitis J32.9   • Cervical stenosis of spinal canal M48.02   • Lower extremity edema R60.0   • Never smoked tobacco Z78.9   • Need for

## 2024-10-18 NOTE — PROGRESS NOTES
Physical Therapy  Facility/Department: [address] NEURO  Physical Therapy Initial Assessment    Name: Berny Hoyos  : 1985  MRN: [id number]  Date of Service: 10/18/2024  S/p: C6-7 arthroplasty on 10/18  Discharge Recommendations:  No therapy recommended at discharge   PT Equipment Recommendations  Equipment Needed: No        Past Medical History:  has a past medical history of Cervical spondylolysis, Chronic infection of both ears, Class 3 severe obesity due to excess calories in adult, COVID, Diverticulitis, Dyslipidemia, Hypertension, Lumbar strain, Prediabetes, Snores, Under care of team, and Under care of team.  Past Surgical History:  has a past surgical history that includes Appendectomy; Tympanostomy tube placement; [place] tooth extraction; and arthroplasty (Right, 10/18/2024).    Assessment  Body Structures, Functions, Activity Limitations Requiring Skilled Therapeutic Intervention: Decreased functional mobility ;Decreased balance;Decreased strength  Assessment: Pt performed well during PT this date.  Pt was able to ambulate 250 feet with no AD and SBA, negotiate 3 stairs with a R handrail and SBA.  Pt does demonstrate some RUE impaired sensation as well as mildly impaired balance.  Pt would benefit from additional PT during inpatient hospital stay to assist in return to independent PLOF.  Pt is expected to be safe to return to prior living arrangements with assistance from supportive family as needed.  Therapy Prognosis: Good  Decision Making: Low Complexity  Requires PT Follow-Up: Yes  Activity Tolerance  Activity Tolerance: Patient tolerated treatment well    Plan  Physical Therapy Plan  General Plan: 2-3 times per week  Current Treatment Recommendations: Strengthening, ROM, Balance training, Functional mobility training, Transfer training, Gait training, Stair training, Safety education & training, Home exercise program, Therapeutic activities, Patient/Caregiver education & training, Equipment

## 2024-10-18 NOTE — INTERVAL H&P NOTE
Pt Name: Berny Hoyos  MRN: [id number]  YOB: 1985  Date of evaluation: 10/18/2024    I have reviewed the patient's history and physical examination completed in pre-admission testing on 10/4/2024.    No changes to history or on examination today, unless noted below.   Patient had echo completed, results in Epic.    EJ Espinosa - CNP  10/18/24  6:44 AM

## 2024-10-19 ENCOUNTER — APPOINTMENT (OUTPATIENT)
Dept: GENERAL RADIOLOGY | Age: 39
DRG: 518 | End: 2024-10-19
Attending: NEUROLOGICAL SURGERY
Payer: COMMERCIAL

## 2024-10-19 VITALS
BODY MASS INDEX: 39.9 KG/M2 | WEIGHT: 285 LBS | TEMPERATURE: 98.3 F | HEIGHT: 71 IN | DIASTOLIC BLOOD PRESSURE: 71 MMHG | HEART RATE: 69 BPM | RESPIRATION RATE: 14 BRPM | OXYGEN SATURATION: 93 % | SYSTOLIC BLOOD PRESSURE: 138 MMHG

## 2024-10-19 LAB
ANION GAP SERPL CALCULATED.3IONS-SCNC: 11 MMOL/L (ref 9–16)
BASOPHILS # BLD: <0.03 K/UL (ref 0–0.2)
BASOPHILS NFR BLD: 0 % (ref 0–2)
BUN SERPL-MCNC: 17 MG/DL (ref 6–20)
CALCIUM SERPL-MCNC: 8.6 MG/DL (ref 8.6–10.4)
CHLORIDE SERPL-SCNC: 101 MMOL/L (ref 98–107)
CO2 SERPL-SCNC: 24 MMOL/L (ref 20–31)
CREAT SERPL-MCNC: 0.9 MG/DL (ref 0.7–1.2)
EOSINOPHIL # BLD: <0.03 K/UL (ref 0–0.44)
EOSINOPHILS RELATIVE PERCENT: 0 % (ref 1–4)
ERYTHROCYTE [DISTWIDTH] IN BLOOD BY AUTOMATED COUNT: 13.1 % (ref 11.8–14.4)
GFR, ESTIMATED: >90 ML/MIN/1.73M2
GLUCOSE BLD-MCNC: 163 MG/DL (ref 75–110)
GLUCOSE BLD-MCNC: 218 MG/DL (ref 75–110)
GLUCOSE SERPL-MCNC: 198 MG/DL (ref 74–99)
HCT VFR BLD AUTO: 42.7 % (ref 40.7–50.3)
HGB BLD-MCNC: 14.7 G/DL (ref 13–17)
IMM GRANULOCYTES # BLD AUTO: 0.09 K/UL (ref 0–0.3)
IMM GRANULOCYTES NFR BLD: 1 %
LYMPHOCYTES NFR BLD: 1.95 K/UL (ref 1.1–3.7)
LYMPHOCYTES RELATIVE PERCENT: 11 % (ref 24–43)
MCH RBC QN AUTO: 31.1 PG (ref 25.2–33.5)
MCHC RBC AUTO-ENTMCNC: 34.4 G/DL (ref 28.4–34.8)
MCV RBC AUTO: 90.3 FL (ref 82.6–102.9)
MONOCYTES NFR BLD: 1.08 K/UL (ref 0.1–1.2)
MONOCYTES NFR BLD: 6 % (ref 3–12)
NEUTROPHILS NFR BLD: 82 % (ref 36–65)
NEUTS SEG NFR BLD: 13.9 K/UL (ref 1.5–8.1)
NRBC BLD-RTO: 0 PER 100 WBC
PLATELET # BLD AUTO: 278 K/UL (ref 138–453)
PMV BLD AUTO: 10 FL (ref 8.1–13.5)
POTASSIUM SERPL-SCNC: 3.8 MMOL/L (ref 3.7–5.3)
RBC # BLD AUTO: 4.73 M/UL (ref 4.21–5.77)
SODIUM SERPL-SCNC: 136 MMOL/L (ref 136–145)
WBC OTHER # BLD: 17.1 K/UL (ref 3.5–11.3)

## 2024-10-19 PROCEDURE — 36415 COLL VENOUS BLD VENIPUNCTURE: CPT

## 2024-10-19 PROCEDURE — 85025 COMPLETE CBC W/AUTO DIFF WBC: CPT

## 2024-10-19 PROCEDURE — 72050 X-RAY EXAM NECK SPINE 4/5VWS: CPT

## 2024-10-19 PROCEDURE — 6360000002 HC RX W HCPCS: Performed by: PHYSICIAN ASSISTANT

## 2024-10-19 PROCEDURE — 82947 ASSAY GLUCOSE BLOOD QUANT: CPT

## 2024-10-19 PROCEDURE — APPSS15 APP SPLIT SHARED TIME 0-15 MINUTES: Performed by: NURSE PRACTITIONER

## 2024-10-19 PROCEDURE — 80048 BASIC METABOLIC PNL TOTAL CA: CPT

## 2024-10-19 PROCEDURE — 6370000000 HC RX 637 (ALT 250 FOR IP): Performed by: PHYSICIAN ASSISTANT

## 2024-10-19 PROCEDURE — 94761 N-INVAS EAR/PLS OXIMETRY MLT: CPT

## 2024-10-19 RX ORDER — FAMOTIDINE 20 MG/1
20 TABLET, FILM COATED ORAL 2 TIMES DAILY
Qty: 60 TABLET | Refills: 3 | Status: SHIPPED | OUTPATIENT
Start: 2024-10-19

## 2024-10-19 RX ORDER — IBUPROFEN 600 MG/1
600 TABLET, FILM COATED ORAL 3 TIMES DAILY PRN
Qty: 30 TABLET | Refills: 0 | Status: SHIPPED | OUTPATIENT
Start: 2024-10-19 | End: 2024-11-02

## 2024-10-19 RX ORDER — CYCLOBENZAPRINE HCL 10 MG
10 TABLET ORAL 3 TIMES DAILY PRN
Qty: 30 TABLET | Refills: 0 | Status: SHIPPED | OUTPATIENT
Start: 2024-10-19 | End: 2024-10-29

## 2024-10-19 RX ORDER — OXYCODONE AND ACETAMINOPHEN 5; 325 MG/1; MG/1
1 TABLET ORAL EVERY 6 HOURS PRN
Qty: 28 TABLET | Refills: 0 | Status: SHIPPED | OUTPATIENT
Start: 2024-10-19 | End: 2024-10-26

## 2024-10-19 RX ADMIN — BENZOCAINE AND MENTHOL 1 LOZENGE: 15; 3.6 LOZENGE ORAL at 05:07

## 2024-10-19 RX ADMIN — BENZOCAINE AND MENTHOL 1 LOZENGE: 15; 3.6 LOZENGE ORAL at 11:07

## 2024-10-19 RX ADMIN — METOPROLOL TARTRATE 50 MG: 50 TABLET, FILM COATED ORAL at 08:20

## 2024-10-19 RX ADMIN — FAMOTIDINE 20 MG: 20 TABLET, FILM COATED ORAL at 08:20

## 2024-10-19 RX ADMIN — OXYCODONE 10 MG: 5 TABLET ORAL at 04:08

## 2024-10-19 RX ADMIN — IBUPROFEN 600 MG: 400 TABLET, FILM COATED ORAL at 00:14

## 2024-10-19 RX ADMIN — OXYCODONE 10 MG: 5 TABLET ORAL at 08:20

## 2024-10-19 RX ADMIN — METFORMIN HYDROCHLORIDE 500 MG: 500 TABLET ORAL at 08:23

## 2024-10-19 RX ADMIN — Medication 3000 MG: at 00:08

## 2024-10-19 RX ADMIN — ACETAMINOPHEN 650 MG: 325 TABLET ORAL at 10:51

## 2024-10-19 RX ADMIN — CYCLOBENZAPRINE 10 MG: 10 TABLET, FILM COATED ORAL at 08:20

## 2024-10-19 RX ADMIN — HYDROCHLOROTHIAZIDE 25 MG: 25 TABLET ORAL at 08:24

## 2024-10-19 RX ADMIN — ACETAMINOPHEN 650 MG: 325 TABLET ORAL at 04:08

## 2024-10-19 RX ADMIN — GABAPENTIN 400 MG: 400 CAPSULE ORAL at 08:20

## 2024-10-19 RX ADMIN — BENZOCAINE AND MENTHOL 1 LOZENGE: 15; 3.6 LOZENGE ORAL at 08:18

## 2024-10-19 RX ADMIN — Medication 3000 MG: at 08:23

## 2024-10-19 RX ADMIN — POLYETHYLENE GLYCOL 3350 17 G: 17 POWDER, FOR SOLUTION ORAL at 08:33

## 2024-10-19 RX ADMIN — IBUPROFEN 600 MG: 400 TABLET, FILM COATED ORAL at 08:19

## 2024-10-19 ASSESSMENT — PAIN DESCRIPTION - DESCRIPTORS
DESCRIPTORS: ACHING

## 2024-10-19 ASSESSMENT — PAIN SCALES - GENERAL
PAINLEVEL_OUTOF10: 3
PAINLEVEL_OUTOF10: 7
PAINLEVEL_OUTOF10: 8
PAINLEVEL_OUTOF10: 0
PAINLEVEL_OUTOF10: 0
PAINLEVEL_OUTOF10: 8
PAINLEVEL_OUTOF10: 0
PAINLEVEL_OUTOF10: 7
PAINLEVEL_OUTOF10: 4

## 2024-10-19 ASSESSMENT — PAIN SCALES - WONG BAKER
WONGBAKER_NUMERICALRESPONSE: NO HURT
WONGBAKER_NUMERICALRESPONSE: NO HURT

## 2024-10-19 ASSESSMENT — PAIN DESCRIPTION - ORIENTATION
ORIENTATION: RIGHT;LEFT
ORIENTATION: ANTERIOR
ORIENTATION: RIGHT
ORIENTATION: ANTERIOR

## 2024-10-19 ASSESSMENT — PAIN DESCRIPTION - LOCATION
LOCATION: NECK;SHOULDER
LOCATION: THROAT
LOCATION: THROAT
LOCATION: SHOULDER

## 2024-10-19 ASSESSMENT — PAIN - FUNCTIONAL ASSESSMENT: PAIN_FUNCTIONAL_ASSESSMENT: ACTIVITIES ARE NOT PREVENTED

## 2024-10-19 NOTE — DISCHARGE SUMMARY
Department of Neurosurgery                                            Discharge Summary       PATIENT NAME: Berny Hoyos  YOB: 1985  MEDICAL RECORD NO. [id number]  DATE: 10/19/2024  PRIMARY CARE PHYSICIAN: Richelle Burton MD  DISCHARGE DATE:  10/19/2024 12:24 PM  DISCHARGE DIAGNOSIS:   Patient Active Problem List   Diagnosis Code    Primary hypertension I10    Morbid obesity E66.01    Pre-diabetes R73.03    Dyslipidemia E78.5    Chronic sinusitis J32.9    Cervical stenosis of spinal canal M48.02    Lower extremity edema R60.0    Never smoked tobacco Z78.9    Need for assessment for sleep apnea Z01.89    Cervical spondylosis with radiculopathy M47.22     DISPOSITION: Home    PROCEDURES:    C6-7 arthroplasty     HOSPITAL COURSE     Berny Hoyos originally presented to the hospital on 10/18/2024  6:04 AM with cervical spondylosis with radiculopathy . He was admitted and taken to the OR for neurosurgery for procedure listed above.   Patient tolerated all procedures well.     Labs and imaging were followed daily.  At time of discharge, Berny Hoyos was tolerating a ADULT DIET; Regular; 4 carb choices (60 gm/meal), having bowel movements, ambulating on his own accord and had adequate analgesia on oral pain medications, and had no signs of symptoms of complications.  He is medically stable to be discharged.       PHYSICAL EXAMINATION        Discharge Vitals:  height is 1.803 m (5' 11\") and weight is 129.3 kg (285 lb). His oral temperature is 98.3 °F (36.8 °C). His blood pressure is 138/71 and his pulse is 69. His respiration is 14 and oxygen saturation is 93%.   AOx3   PERRL, EOMI     Motor   L deltoid 5/5; R deltoid 5/5  L biceps 5/5; R biceps 5/5  L triceps 5/5; R triceps 5/5  L wrist extension 5/5; R wrist extension 5/5  L intrinsics 5/5; R intrinsics 5/5      L iliopsoas 5/5 , R iliopsoas 5/5  L quadriceps 5/5; R quadriceps 5/5  L Dorsiflexion 5/5; R dorsiflexion 5/5  L Plantarflexion 5/5; R  plantarflexion 5/5  L EHL 5/5; R EHL 5/5        Sensation: intact still reports numbness to right C7 dermatome         Incision: CDI     LABS     Recent Labs     10/19/24  0354   WBC 17.1*   HGB 14.7   HCT 42.7         K 3.8      CO2 24   BUN 17   CREATININE 0.9       DISCHARGE INSTRUCTIONS     Discharge Medications:        Medication List        START taking these medications      cyclobenzaprine 10 MG tablet  Commonly known as: FLEXERIL  Take 1 tablet by mouth 3 times daily as needed for Muscle spasms     famotidine 20 MG tablet  Commonly known as: PEPCID  Take 1 tablet by mouth 2 times daily     ibuprofen 600 MG tablet  Commonly known as: ADVIL;MOTRIN  Take 1 tablet by mouth 3 times daily as needed for Pain     oxyCODONE-acetaminophen 5-325 MG per tablet  Commonly known as: Percocet  Take 1 tablet by mouth every 6 hours as needed for Pain for up to 7 days. Intended supply: 7 days. Take lowest dose possible to manage pain Max Daily Amount: 4 tablets            CONTINUE taking these medications      gabapentin 400 MG capsule  Commonly known as: NEURONTIN  Take 1 capsule by mouth 3 times daily for 90 days. Intended supply: 30 days     hydroCHLOROthiazide 25 MG tablet  Commonly known as: HYDRODIURIL  Take 1 tablet by mouth every morning     metFORMIN 500 MG tablet  Commonly known as: GLUCOPHAGE  Take 1 tablet daily with supper for 7 days, THEN 1 tablet 2 times daily (with meals) for 7 days, THEN 1 tablet with breakfast and 2 tablets with supper for 7 days, THEN 2 tablets daily (with meals).     metoprolol tartrate 50 MG tablet  Commonly known as: LOPRESSOR  Take 1 tablet by mouth 2 times daily               Where to Get Your Medications        [facility] [place] [facility] [address] [place] [address] - P [phone] - F [phone]  [facility] [place][note 3] [facility] [address]      Phone: [phone]   cyclobenzaprine 10 MG tablet  famotidine 20 MG tablet  ibuprofen 600 MG

## 2024-10-19 NOTE — CARE COORDINATION
Case Management Assessment  Initial Evaluation    Date/Time of Evaluation: 10/19/2024 10:16 AM  Assessment Completed by: SHANAE SUBRAMANIAN RN    If patient is discharged prior to next notation, then this note serves as note for discharge by case management.    Patient Name: Berny Hoyos                   YOB: 1985  Diagnosis: Cervical spondylosis with radiculopathy [M47.22]                   Date / Time: 10/18/2024  6:04 AM    Patient Admission Status: Inpatient   Readmission Risk (Low < 19, Mod (19-27), High > 27): Readmission Risk Score: 4.4    Current PCP: Richelle Burton MD  PCP verified by CM? Yes    Chart Reviewed: Yes      History Provided by: Patient  Patient Orientation: Alert and Oriented    Patient Cognition: Alert    Hospitalization in the last 30 days (Readmission):  No    If yes, Readmission Assessment in CM Navigator will be completed.    Advance Directives:      Code Status: Full Code   Patient's Primary Decision Maker is: Legal Next of Kin      Discharge Planning:    Patient lives with: (P) Spouse/Significant Other (girlfriend) Type of Home: (P) House  Primary Care Giver: Self  Patient Support Systems include: Spouse/Significant Other   Current Financial resources:    Current community resources:    Current services prior to admission: (P) None            Current DME:              Type of Home Care services:  (P) None    ADLS  Prior functional level: Independent in ADLs/IADLs  Current functional level: (P) Independent in ADLs/IADLs    PT AM-PAC: 21 /24  OT AM-PAC:   /24    Family can provide assistance at DC: (P) Yes  Would you like Case Management to discuss the discharge plan with any other family members/significant others, and if so, who? (P) No  Plans to Return to Present Housing: (P) Yes  Other Identified Issues/Barriers to RETURNING to current housing: pain  Potential Assistance needed at discharge: (P) N/A            Potential DME:    Patient expects to discharge to: (P)  House  Plan for transportation at discharge: (P) Friends    Financial    Payor: BCBS / Plan: BCBS - [place] PPO / Product Type: *No Product type* /     Does insurance require precert for SNF: Yes    Potential assistance Purchasing Medications: (P) No  Meds-to-Beds request: Yes      [facility] [address] [place] [address] [id number] [address] - P [phone] - F [phone]  [address]  [facility] [address]  Phone: [phone] Fax: [phone]      Notes:    Factors facilitating achievement of predicted outcomes: Friend support, Cooperative, and Pleasant    Barriers to discharge: Pain    Additional Case Management Notes: Plan home. Transportation girlfriend.     The Plan for Transition of Care is related to the following treatment goals of Cervical spondylosis with radiculopathy [M47.22]    IF APPLICABLE: The Patient and/or patient representative Berny and his family were provided with a choice of provider and agrees with the discharge plan. Freedom of choice list with basic dialogue that supports the patient's individualized plan of care/goals and shares the quality data associated with the providers was provided to:     Patient Representative Name:       The Patient and/or Patient Representative Agree with the Discharge Plan?      SHANAE SUBRAMANIAN RN  Case Management Department  Ph: [phone]

## 2024-10-19 NOTE — DISCHARGE INSTRUCTIONS
Anterior Cervical Surgery Discharge Instructions     Thank you for choosing [facility] [place] and [facility] for your surgical needs. The following instructions will help to ensure your comfort and that you are well prepared after your surgery.       Post-Operative Visit:     The office is located at:    [facility]    [facility] [address], [address], [place] [address]    [phone]     Please also call your primary care physician to schedule an appointment for further evaluation and care.     Nutrition:   You may resume your regular diet.   It is normal to have a sore throat and some difficulty swallowing solid foods. You are able to resume your regular diet, however you may be more comfortable at first eating softer foods such as mashed potatoes, ice cream, jello or soup.   Be sure to eat a well-balanced diet. Protein promotes wound healing.   Pain medication and decreased activity can cause constipation. Drink 8-10 glasses of water a day, eat fresh fruits and vegetables, and add prunes, raisins and bran cereals to your diet if you do become constipated. A stool softener taken 1-2 times a day is helpful. Dulcolax suppositories or Fleets enemas are also available without a prescription. Call our office if the problem continues.     Activity and Exercise:   No driving until you are seen in the office.   Avoid riding in a car for the first two weeks until you come to the office for your scheduled follow-up.   Start taking short, frequent walks in the beginning. [place], more frequent walks throughout the day are more beneficial than one long walk each day. You may gradually increase the distance; as tolerated. Your brace will help give support to your muscles while you walk.   If your pain increases, you may be walking too much or too far. Try backing off for a day or two and then resume slowly.   No lifting greater than 5 lbs

## 2024-10-19 NOTE — PROGRESS NOTES
Neurosurgery [facility]/Resident    Daily Progress Note   CC:No chief complaint on file.    10/19/2024  9:43 AM    Chart reviewed.  No acute events overnight.  No new complaints. Doing well this morning, reports post op pain, still having numbness in right C7 dermatome, has been up to bathroom since surgery, tolerating diet, reports some soreness in throat, he would like to go home today if possible     Vitals:    10/19/24 0408 10/19/24 0438 10/19/24 0501 10/19/24 0715   BP:   122/82 127/87   Pulse: 78  77 75   Resp: 15 12 19 14   Temp:   98 °F (36.7 °C) 98 °F (36.7 °C)   TempSrc:   Oral Oral   SpO2: 96%  95% 94%   Weight:       Height:           PE:   AOx3   PERRL, EOMI    Motor   L deltoid 5/5; R deltoid 5/5  L biceps 5/5; R biceps 5/5  L triceps 5/5; R triceps 5/5  L wrist extension 5/5; R wrist extension 5/5  L intrinsics 5/5; R intrinsics 5/5      L iliopsoas 5/5 , R iliopsoas 5/5  L quadriceps 5/5; R quadriceps 5/5  L Dorsiflexion 5/5; R dorsiflexion 5/5  L Plantarflexion 5/5; R plantarflexion 5/5  L EHL 5/5; R EHL 5/5      Sensation: intact still reports numbness to right C7 dermatome       Incision: CDI       Lab Results   Component Value Date    WBC 17.1 (H) 10/19/2024    HGB 14.7 10/19/2024    HCT 42.7 10/19/2024     10/19/2024    CHOL 192 09/25/2024    TRIG 89 09/25/2024    HDL 35 (L) 09/25/2024    ALT 48 10/11/2024    AST 36 10/11/2024     10/19/2024    K 3.8 10/19/2024     10/19/2024    CREATININE 0.9 10/19/2024    BUN 17 10/19/2024    CO2 24 10/19/2024    TSH 2.00 10/11/2024    INR 0.9 04/08/2024    LABA1C 7.5 (H) 09/25/2024       Radiology   XR CERVICAL SPINE (4-5 VIEWS)    Result Date: 10/19/2024  EXAMINATION: 4 XRAY VIEWS OF THE CERVICAL SPINE 10/19/2024 9:20 am COMPARISON: 09/09/2024 HISTORY: ORDERING SYSTEM PROVIDED HISTORY: Postop C6-7 arthroplasty. TECHNOLOGIST PROVIDED HISTORY: Please perform upright AP lateral views of the cervical spine together with flexion-extension views.

## 2024-10-19 NOTE — PLAN OF CARE
Problem: Discharge Planning  Goal: Discharge to home or other facility with appropriate resources  10/19/2024 1131 by Halie Farnsworth RN  Outcome: Completed  Flowsheets (Taken 10/19/2024 0800)  Discharge to home or other facility with appropriate resources: Identify barriers to discharge with patient and caregiver  10/19/2024 0735 by Halie Farnsworth RN  Outcome: Progressing     Problem: Pain  Goal: Verbalizes/displays adequate comfort level or baseline comfort level  10/19/2024 1131 by Halie Farnsworth RN  Outcome: Completed  10/19/2024 0735 by Halie Farnsworth RN  Outcome: Progressing  10/19/2024 0529 by Chan Dunne RN  Outcome: Progressing     Problem: Safety - Adult  Goal: Free from fall injury  10/19/2024 1131 by Halie Farnsworth RN  Outcome: Completed  Flowsheets (Taken 10/19/2024 0958)  Free From Fall Injury: Instruct family/caregiver on patient safety  10/19/2024 0735 by Halie Farnsworth RN  Outcome: Progressing  10/19/2024 0529 by Chan Dunne RN  Outcome: Progressing     Problem: ABCDS Injury Assessment  Goal: Absence of physical injury  10/19/2024 1131 by Halie Farnsworth RN  Outcome: Completed  Flowsheets (Taken 10/19/2024 0958)  Absence of Physical Injury: Implement safety measures based on patient assessment  10/19/2024 0735 by Halie Farnsworth RN  Outcome: Progressing  10/19/2024 0529 by Chan Dunne RN  Outcome: Progressing

## 2024-10-19 NOTE — PROGRESS NOTES
CLINICAL PHARMACY NOTE: MEDS TO BEDS    Total # of Prescriptions Filled: 4   The following medications were delivered to the patient:  Famotidine 20mg  Ibuprofen 600mg  Cyclobenzaprine 10mg  Percocet 5-325mg    Additional Documentation: delivered to patient in room 101 10/19 at 12:14pm. Co-pay $27.04 cash.

## 2024-10-19 NOTE — PLAN OF CARE
Problem: Discharge Planning  Goal: Discharge to home or other facility with appropriate resources  Outcome: Progressing     Problem: Pain  Goal: Verbalizes/displays adequate comfort level or baseline comfort level  10/19/2024 0735 by Halie Farnsworth RN  Outcome: Progressing  10/19/2024 0529 by Chan Dunne RN  Outcome: Progressing     Problem: Safety - Adult  Goal: Free from fall injury  10/19/2024 0735 by Halie Farnsworth RN  Outcome: Progressing  10/19/2024 0529 by Chan Dunne RN  Outcome: Progressing     Problem: ABCDS Injury Assessment  Goal: Absence of physical injury  10/19/2024 0735 by Halie Farnsworth RN  Outcome: Progressing  10/19/2024 0529 by Chan Dunne RN  Outcome: Progressing

## 2024-10-21 ENCOUNTER — TELEPHONE (OUTPATIENT)
Dept: PRIMARY CARE CLINIC | Age: 39
End: 2024-10-21

## 2024-10-21 NOTE — TELEPHONE ENCOUNTER
Care Transitions Initial Follow Up Call    Outreach made within 2 business days of discharge: Yes    Patient: Berny Hoyos Patient : 1985   MRN: [id number]  Reason for Admission: Cervical spondylosis with radiculopathy   Discharge Date: 10/19/24       Spoke with: Berny    [facility] Patient Contact:  Was patient able to fill all prescriptions: No: patient declined hospital follow up  Was patient instructed to bring all medications to the follow-up visit: No: patient declined hospital follow up  Is patient taking all medications as directed in the discharge summary? No  Does patient understand their discharge instructions: No: patient declined hospital follow up  Does patient have questions or concerns that need addressed prior to 7-14 day follow up office visit: no    Additional needs identified to be addressed with provider  No needs identified             Scheduled appointment with PCP within 7-14 days    Follow Up  Future Appointments   Date Time Provider Department Center   2024  1:30 PM Shanthi Linda, APRN - CNP [facility]   2024  7:30 PM [facility]  1 [facility]   2024  1:00 PM Tabitha Adkins DO [facility]   1/10/2025  9:00 AM Richelle Burton MD [facility]       JULIO CESAR BRANHAM MA

## 2024-11-01 ENCOUNTER — OFFICE VISIT (OUTPATIENT)
Dept: NEUROSURGERY | Age: 39
End: 2024-11-01

## 2024-11-01 VITALS
SYSTOLIC BLOOD PRESSURE: 152 MMHG | HEART RATE: 105 BPM | HEIGHT: 71 IN | WEIGHT: 280.3 LBS | BODY MASS INDEX: 39.24 KG/M2 | DIASTOLIC BLOOD PRESSURE: 96 MMHG

## 2024-11-01 DIAGNOSIS — M47.22 CERVICAL SPONDYLOSIS WITH RADICULOPATHY: ICD-10-CM

## 2024-11-01 DIAGNOSIS — Z98.890 S/P CERVICAL DISCECTOMY: Primary | ICD-10-CM

## 2024-11-01 PROCEDURE — 99024 POSTOP FOLLOW-UP VISIT: CPT

## 2024-11-01 RX ORDER — CYCLOBENZAPRINE HCL 10 MG
10 TABLET ORAL 3 TIMES DAILY PRN
Qty: 30 TABLET | Refills: 0 | Status: SHIPPED | OUTPATIENT
Start: 2024-11-01 | End: 2024-11-11

## 2024-11-01 RX ORDER — OXYCODONE AND ACETAMINOPHEN 5; 325 MG/1; MG/1
1 TABLET ORAL EVERY 6 HOURS PRN
Qty: 28 TABLET | Refills: 0 | Status: SHIPPED | OUTPATIENT
Start: 2024-11-01 | End: 2024-11-08

## 2024-11-01 NOTE — PROGRESS NOTES
[facility] NEUROSURGERY [address] [place] [address] [facility]  [address]  [address] - [address] FLOOR, [address]  [facility] [address]  Dept: [phone]    Patient:  Berny Hoyos  YOB: 1985  Date: 11/1/24    The patient is a 39 y.o. male who presents today for consult of the following problems:     Chief Complaint   Patient presents with    Post-Op Check     Pt reports he has been doing well since surgery, reports he has regained feeling in his arm but tingling is still present in finger, and did reports some pain in neck yesterday.  Never given collar for neck.         HPI:     Berny Hoyos is a 39 y.o. male who presents to the office for post-op evaluation s/p C6-C7 arthroplasty.     Patient reports he doing is well, with resolving right arm symptoms and improved swallowing. However, numbness persists in the right thumb, index finger, and middle finger. Pain began between the shoulder blades (left worse than right) after running out of pain medication. Neurological examination shows no new weakness; upper extremity motor strength is intact, with stable reflexes bilaterally, though sensory changes are noted in the right thumb, index, and middle fingers. The surgical incision appears clean, with no signs of infection such as redness, swelling, or discharge, and there is no tenderness or abnormal swelling around the site. Despite some residual numbness and post-operative discomfort, the patient is progressing well, with no significant limitations in daily activities.    Sensation intact  Motor 5/5  Incision clean, dry, and intact    Date of surgery: 10/18/24    Assessment and Plan:     1. S/P cervical discectomy    2. Cervical spondylosis with radiculopathy         Plan: Patient is recovering well. Refills sent. Obtain flexion/extension cervical X-rays prior to the next post-op appointment to evaluate for any changes or complications. The patient should

## 2024-11-07 ENCOUNTER — OFFICE VISIT (OUTPATIENT)
Dept: PRIMARY CARE CLINIC | Age: 39
End: 2024-11-07
Payer: COMMERCIAL

## 2024-11-07 VITALS
DIASTOLIC BLOOD PRESSURE: 88 MMHG | HEIGHT: 71 IN | WEIGHT: 283 LBS | BODY MASS INDEX: 39.62 KG/M2 | OXYGEN SATURATION: 98 % | SYSTOLIC BLOOD PRESSURE: 130 MMHG | HEART RATE: 87 BPM

## 2024-11-07 DIAGNOSIS — I10 PRIMARY HYPERTENSION: Primary | ICD-10-CM

## 2024-11-07 DIAGNOSIS — R79.89 LOW SERUM TESTOSTERONE LEVEL IN MALE: ICD-10-CM

## 2024-11-07 DIAGNOSIS — E66.01 CLASS 2 SEVERE OBESITY DUE TO EXCESS CALORIES WITH SERIOUS COMORBIDITY AND BODY MASS INDEX (BMI) OF 39.0 TO 39.9 IN ADULT: ICD-10-CM

## 2024-11-07 DIAGNOSIS — Z78.9 NEVER SMOKED TOBACCO: ICD-10-CM

## 2024-11-07 DIAGNOSIS — E11.9 TYPE 2 DIABETES MELLITUS WITHOUT COMPLICATION, WITHOUT LONG-TERM CURRENT USE OF INSULIN (HCC): ICD-10-CM

## 2024-11-07 DIAGNOSIS — E66.812 CLASS 2 SEVERE OBESITY DUE TO EXCESS CALORIES WITH SERIOUS COMORBIDITY AND BODY MASS INDEX (BMI) OF 39.0 TO 39.9 IN ADULT: ICD-10-CM

## 2024-11-07 PROCEDURE — 99214 OFFICE O/P EST MOD 30 MIN: CPT | Performed by: STUDENT IN AN ORGANIZED HEALTH CARE EDUCATION/TRAINING PROGRAM

## 2024-11-07 PROCEDURE — 3079F DIAST BP 80-89 MM HG: CPT | Performed by: STUDENT IN AN ORGANIZED HEALTH CARE EDUCATION/TRAINING PROGRAM

## 2024-11-07 PROCEDURE — 3075F SYST BP GE 130 - 139MM HG: CPT | Performed by: STUDENT IN AN ORGANIZED HEALTH CARE EDUCATION/TRAINING PROGRAM

## 2024-11-07 PROCEDURE — 3051F HG A1C>EQUAL 7.0%<8.0%: CPT | Performed by: STUDENT IN AN ORGANIZED HEALTH CARE EDUCATION/TRAINING PROGRAM

## 2024-11-07 RX ORDER — METFORMIN HYDROCHLORIDE 500 MG/1
TABLET, EXTENDED RELEASE ORAL
Qty: 318 TABLET | Refills: 0 | Status: SHIPPED | OUTPATIENT
Start: 2024-11-07 | End: 2025-02-05

## 2024-11-07 RX ORDER — HYDROCHLOROTHIAZIDE 25 MG/1
25 TABLET ORAL EVERY MORNING
Qty: 90 TABLET | Refills: 0 | Status: SHIPPED | OUTPATIENT
Start: 2024-11-07

## 2024-11-07 RX ORDER — METOPROLOL TARTRATE 50 MG
50 TABLET ORAL 2 TIMES DAILY
Qty: 180 TABLET | Refills: 0 | Status: SHIPPED | OUTPATIENT
Start: 2024-11-07 | End: 2025-02-05

## 2024-11-07 NOTE — PROGRESS NOTES
without long-term current use of insulin (HCC)    Dyslipidemia    Chronic sinusitis    Cervical stenosis of spinal canal    Lower extremity edema    Never smoked tobacco    Cervical spondylosis with radiculopathy    Low serum testosterone level in male       Past Medical History:   Diagnosis Date    Cervical spondylolysis 2024    Chronic infection of both ears     Class 3 severe obesity due to excess calories in adult 09/25/2024    COVID 09/2021    Sore throat, cough, ear congestion,night chills, severe headache    Diverticulitis 04/30/2024    ER visit    Dyslipidemia     Hypertension     Lumbar strain 10/2019    Prediabetes 09/25/2024    Snores 10/04/2024    Under care of team     PCP - Dr. Burton - last visit 9/25/2024    Under care of team     Neurosurgery - Dr. Monzon - last visit       Past Surgical History:   Procedure Laterality Date    APPENDECTOMY      ARTHROPLASTY Right 10/18/2024    CERVICAL DISC ARTHROPLASTY Right 10/18/2024    C6-C7 ARTHROPLASTY performed by Tabitha Adkins DO at [facility] OR    TYMPANOSTOMY TUBE PLACEMENT      WISDOM TOOTH EXTRACTION          Social History     Socioeconomic History    Marital status:      Spouse name: None    Number of children: None    Years of education: None    Highest education level: None   Tobacco Use    Smoking status: Never    Smokeless tobacco: Never   Vaping Use    Vaping status: Never Used   Substance and Sexual Activity    Alcohol use: Not Currently     Comment: \"Maybe a 12 pack a week.\"    Sexual activity: Yes     Partners: Female     Social Determinants of Health     Financial Resource Strain: Low Risk  (12/12/2023)    Overall Financial Resource Strain (CARDIA)     Difficulty of Paying Living Expenses: Not hard at all   Food Insecurity: No Food Insecurity (10/18/2024)    Hunger Vital Sign     Worried About Running Out of Food in the Last Year: Never true     Ran Out of Food in the Last Year: Never true   Transportation Needs: No Transportation Needs

## 2024-11-07 NOTE — PATIENT INSTRUCTIONS
Take 1 tablet daily with supper for 7 days  THEN 1 tablet 2 times daily (with meals) for 7 days  THEN 1 tablet with breakfast and 2 tablets with supper for 7 days  THEN 2 tablets daily (with meals). Then stay at this dose for another month. If you are about to run out before you see me, please call the office to let me know.     Check out [employer] to see if there is a dietitian that is covered by your insurance company.

## 2024-11-08 ENCOUNTER — HOSPITAL ENCOUNTER (OUTPATIENT)
Age: 39
Setting detail: SPECIMEN
Discharge: HOME | End: 2024-11-08

## 2024-11-08 DIAGNOSIS — R79.89 LOW SERUM TESTOSTERONE LEVEL IN MALE: ICD-10-CM

## 2024-11-08 DIAGNOSIS — R53.83 OTHER FATIGUE: ICD-10-CM

## 2024-11-08 PROBLEM — E66.01 MORBID OBESITY: Status: RESOLVED | Noted: 2023-12-14 | Resolved: 2024-11-08

## 2024-11-08 PROBLEM — E11.9 TYPE 2 DIABETES MELLITUS WITHOUT COMPLICATION, WITHOUT LONG-TERM CURRENT USE OF INSULIN (HCC): Status: ACTIVE | Noted: 2023-12-14

## 2024-11-08 LAB — TESTOST SERPL-MCNC: 227 NG/DL (ref 249–836)

## 2024-11-08 NOTE — ASSESSMENT & PLAN NOTE
-Newly diagnosed diabetic  -Patient previously on metformin, stopped taking medication once prescription was completed  -will restart metformin ramp up today

## 2024-11-08 NOTE — ASSESSMENT & PLAN NOTE
-low serum value in one test, so pt to repeat am testing  -if repeat level is low, will refer to urology for further eval/management   no

## 2024-11-10 PROBLEM — Z01.89 NEED FOR ASSESSMENT FOR SLEEP APNEA: Status: RESOLVED | Noted: 2024-10-11 | Resolved: 2024-11-10

## 2024-11-12 ENCOUNTER — CLINICAL DOCUMENTATION (OUTPATIENT)
Dept: PHYSICAL THERAPY | Facility: CLINIC | Age: 39
End: 2024-11-12

## 2024-11-12 NOTE — DISCHARGE SUMMARY
[x] [facility]  Outpatient Rehabilitation &  Therapy  [address]  P: [phone]  F: [phone]     Physical Therapy Discharge Note    Date: 2024      Patient: Berny Hoyos  : 1985  MRN: [id number]   Tigre Albright MD   Insurance: - BCBS- beatriz yr, vis 60/60, comb PT/OT/ST/Cog Ther, copay $30, ded 600/0met, co-ins 20%, OOP 5000/4460rem, est 353.07   Medical Diagnosis: M54.12 (ICD-10-CM) - Cervical radiculopathy              Rehab Codes: M54.12 (ICD-10-CM) - Cervical radiculopathy   Onset Date: 24                 Next 's appt: Pending  Total visits attended:4  Cancels/No shows: 10  Date of initial visit: 2024                  Date of final visit: 2024       Discharge Status:   Pt underwent C6/7 arthroplasty on 10/18/2024.  He reports that since his surgery he has had no difficulty with range of motion and has been adherent to his lifting restrictions.  He reports intermittent numbness and tingling.  PT asked pt if he would like to be scheduled for re-evaluation of therapy s/p surgery and pt notes that [place] and would like to be discharged from this current plan of care.  He notes that he will reach out to his physician for a new referral if he needs skilled physical therapy services upon return.      Electronically signed by: DULCE ADAMES PT    If you have any questions or concerns, please don't hesitate to call.  Thank you for your referral.

## 2024-11-15 DIAGNOSIS — R79.89 LOW TESTOSTERONE IN MALE: Primary | ICD-10-CM

## 2024-12-02 DIAGNOSIS — I10 PRIMARY HYPERTENSION: ICD-10-CM

## 2024-12-04 ENCOUNTER — OFFICE VISIT (OUTPATIENT)
Dept: PRIMARY CARE CLINIC | Age: 39
End: 2024-12-04
Payer: COMMERCIAL

## 2024-12-04 VITALS
DIASTOLIC BLOOD PRESSURE: 88 MMHG | HEIGHT: 71 IN | OXYGEN SATURATION: 95 % | BODY MASS INDEX: 39.34 KG/M2 | SYSTOLIC BLOOD PRESSURE: 122 MMHG | HEART RATE: 83 BPM | WEIGHT: 281 LBS

## 2024-12-04 DIAGNOSIS — R22.42 LOCALIZED SWELLING OF LEFT FOOT: Primary | ICD-10-CM

## 2024-12-04 DIAGNOSIS — Z78.9 NEVER SMOKED TOBACCO: ICD-10-CM

## 2024-12-04 DIAGNOSIS — E66.812 CLASS 2 SEVERE OBESITY DUE TO EXCESS CALORIES WITH SERIOUS COMORBIDITY AND BODY MASS INDEX (BMI) OF 39.0 TO 39.9 IN ADULT: ICD-10-CM

## 2024-12-04 DIAGNOSIS — I10 PRIMARY HYPERTENSION: ICD-10-CM

## 2024-12-04 DIAGNOSIS — Z88.9: ICD-10-CM

## 2024-12-04 DIAGNOSIS — E66.01 CLASS 2 SEVERE OBESITY DUE TO EXCESS CALORIES WITH SERIOUS COMORBIDITY AND BODY MASS INDEX (BMI) OF 39.0 TO 39.9 IN ADULT: ICD-10-CM

## 2024-12-04 PROCEDURE — 99213 OFFICE O/P EST LOW 20 MIN: CPT | Performed by: STUDENT IN AN ORGANIZED HEALTH CARE EDUCATION/TRAINING PROGRAM

## 2024-12-04 PROCEDURE — 3074F SYST BP LT 130 MM HG: CPT | Performed by: STUDENT IN AN ORGANIZED HEALTH CARE EDUCATION/TRAINING PROGRAM

## 2024-12-04 PROCEDURE — 3079F DIAST BP 80-89 MM HG: CPT | Performed by: STUDENT IN AN ORGANIZED HEALTH CARE EDUCATION/TRAINING PROGRAM

## 2024-12-04 RX ORDER — METOPROLOL TARTRATE 50 MG
50 TABLET ORAL 2 TIMES DAILY
Qty: 180 TABLET | Refills: 0 | Status: SHIPPED | OUTPATIENT
Start: 2024-12-04 | End: 2025-03-04

## 2024-12-04 RX ORDER — HYDROCHLOROTHIAZIDE 25 MG/1
25 TABLET ORAL EVERY MORNING
Qty: 90 TABLET | Refills: 0 | Status: SHIPPED | OUTPATIENT
Start: 2024-12-04

## 2024-12-04 NOTE — ASSESSMENT & PLAN NOTE
-pt has adequate BP control with 2 antihypertensives, but do not want to add another medication without first determining which medication is causing the issue  -as localized swelling has been present since mid-September (soon after Lopressor was started), will hold Lopressor and have pt return in 2-3 wks to check progression of lumps and recheck BP  -if lumps have improved, plan to d/c Lopressor- may switch to Valsartan

## 2024-12-04 NOTE — PROGRESS NOTES
10/11/2024 3.3  g/dL Final    Albumin/Globulin Ratio 10/11/2024 1.0  1.0 - 2.5 Final    TSH 10/11/2024 2.00  0.27 - 4.20 uIU/mL Final   Hospital Outpatient Visit on 10/04/2024   Component Date Value Ref Range Status    WBC 10/04/2024 9.3  3.5 - 11.3 k/uL Final    RBC 10/04/2024 5.14  4.21 - 5.77 m/uL Final    Hemoglobin 10/04/2024 15.8  13.0 - 17.0 g/dL Final    Hematocrit 10/04/2024 45.8  40.7 - 50.3 % Final    MCV 10/04/2024 89.1  82.6 - 102.9 fL Final    MCH 10/04/2024 30.7  25.2 - 33.5 pg Final    MCHC 10/04/2024 34.5  28.4 - 34.8 g/dL Final    RDW 10/04/2024 13.1  11.8 - 14.4 % Final    Platelets 10/04/2024 251  138 - 453 k/uL Final    MPV 10/04/2024 10.0  8.1 - 13.5 fL Final    NRBC Automated 10/04/2024 0.0  0.0 per 100 WBC Final    Sodium 10/04/2024 139  136 - 145 mmol/L Final    Potassium 10/04/2024 3.8  3.7 - 5.3 mmol/L Final    SPECIMEN SLIGHTLY HEMOLYZED, RESULTS MAY BE ADVERSELY AFFECTED.    Chloride 10/04/2024 100  98 - 107 mmol/L Final    CO2 10/04/2024 27  20 - 31 mmol/L Final    Anion Gap 10/04/2024 12  9 - 16 mmol/L Final    Glucose 10/04/2024 191 (H)  74 - 99 mg/dL Final    BUN 10/04/2024 12  6 - 20 mg/dL Final    Creatinine 10/04/2024 1.0  0.70 - 1.20 mg/dL Final    Est, Glom Filt Rate 10/04/2024 >90  >60 mL/min/1.73m2 Final    Comment:       These results are not intended for use in patients <18 years of age.        eGFR results are calculated without a race factor using the 2021 CKD-EPI equation.  Careful clinical correlation is recommended, particularly when comparing to results   calculated using previous equations.  The CKD-EPI equation is less accurate in patients with extremes of muscle mass, extra-renal   metabolism of creatine, excessive creatine ingestion, or following therapy that affects   renal tubular secretion.      Calcium 10/04/2024 9.4  8.6 - 10.4 mg/dL Final    Blood Bank Sample Expiration 10/04/2024 10/21/2024,2359   Final    Arm Band Number 10/04/2024 BE [id number]   Final

## 2024-12-09 ENCOUNTER — OFFICE VISIT (OUTPATIENT)
Dept: NEUROSURGERY | Age: 39
End: 2024-12-09

## 2024-12-09 ENCOUNTER — HOSPITAL ENCOUNTER (OUTPATIENT)
Dept: GENERAL RADIOLOGY | Age: 39
Discharge: HOME | End: 2024-12-11
Payer: COMMERCIAL

## 2024-12-09 ENCOUNTER — HOSPITAL ENCOUNTER (OUTPATIENT)
Age: 39
Discharge: HOME | End: 2024-12-11
Payer: COMMERCIAL

## 2024-12-09 VITALS
WEIGHT: 288.2 LBS | DIASTOLIC BLOOD PRESSURE: 99 MMHG | HEART RATE: 78 BPM | BODY MASS INDEX: 40.2 KG/M2 | SYSTOLIC BLOOD PRESSURE: 147 MMHG

## 2024-12-09 DIAGNOSIS — M47.22 CERVICAL SPONDYLOSIS WITH RADICULOPATHY: Primary | ICD-10-CM

## 2024-12-09 DIAGNOSIS — M47.22 CERVICAL SPONDYLOSIS WITH RADICULOPATHY: ICD-10-CM

## 2024-12-09 DIAGNOSIS — Z98.890 S/P CERVICAL DISCECTOMY: ICD-10-CM

## 2024-12-09 PROCEDURE — 99024 POSTOP FOLLOW-UP VISIT: CPT | Performed by: NEUROLOGICAL SURGERY

## 2024-12-09 PROCEDURE — 72050 X-RAY EXAM NECK SPINE 4/5VWS: CPT

## 2024-12-09 NOTE — PROGRESS NOTES
[facility] NEUROSURGERY [place] [address] [facility] [address]  [address]  [facility] [address]  Dept: [phone]    Patient:  Berny Hoyos  YOB: 1985  Date: 12/9/24    The patient is a 39 y.o. male who presents today for consult of the following problems:     Chief Complaint   Patient presents with    Post-Op Check             HPI:     Berny Hoyos is a 39 y.o. male on whom neurosurgical consultation was requested by Richelle Burton MD for management of cervical radiculopathy.  Patient with complete resolution of numbness in the thumb and the middle finger but still and some numbness in the right index finger.  Having some neck discomfort and mainly limitations with extension flexion but is able to rotate.  Does not report any subjective weakness of the right upper extremity..      History:     Past Medical History:   Diagnosis Date    Cervical spondylolysis 2024    Chronic infection of both ears     Class 3 severe obesity due to excess calories in adult 09/25/2024    COVID 09/2021    Sore throat, cough, ear congestion,night chills, severe headache    Diverticulitis 04/30/2024    ER visit    Dyslipidemia     Hypertension     Lumbar strain 10/2019    Prediabetes 09/25/2024    Snores 10/04/2024    Under care of team     PCP - Dr. Burton - last visit 9/25/2024    Under care of team     Neurosurgery - Dr. Monzon - last visit     Past Surgical History:   Procedure Laterality Date    APPENDECTOMY      ARTHROPLASTY Right 10/18/2024    CERVICAL DISC ARTHROPLASTY Right 10/18/2024    C6-C7 ARTHROPLASTY performed by Tabitha Adkins DO at [facility] OR    TYMPANOSTOMY TUBE PLACEMENT      WISDOM TOOTH EXTRACTION       Family History   Problem Relation Age of Onset    Heart Disease Father     High Blood Pressure Father     Heart Attack Father 56    Heart Disease Paternal Grandfather     Heart Attack Paternal Grandfather      Current

## 2024-12-18 ENCOUNTER — OFFICE VISIT (OUTPATIENT)
Age: 39
End: 2024-12-18
Payer: COMMERCIAL

## 2024-12-18 ENCOUNTER — HOSPITAL ENCOUNTER (OUTPATIENT)
Age: 39
Setting detail: SPECIMEN
Discharge: HOME | End: 2024-12-18

## 2024-12-18 VITALS
WEIGHT: 288 LBS | DIASTOLIC BLOOD PRESSURE: 109 MMHG | BODY MASS INDEX: 40.32 KG/M2 | SYSTOLIC BLOOD PRESSURE: 154 MMHG | HEART RATE: 96 BPM | HEIGHT: 71 IN

## 2024-12-18 DIAGNOSIS — R79.89 LOW TESTOSTERONE IN MALE: ICD-10-CM

## 2024-12-18 DIAGNOSIS — R79.89 LOW TESTOSTERONE IN MALE: Primary | ICD-10-CM

## 2024-12-18 DIAGNOSIS — N40.0 BPH WITHOUT OBSTRUCTION/LOWER URINARY TRACT SYMPTOMS: ICD-10-CM

## 2024-12-18 DIAGNOSIS — R35.1 NOCTURIA: ICD-10-CM

## 2024-12-18 LAB
BILIRUBIN, POC: NORMAL
BLOOD URINE, POC: NORMAL
CLARITY, POC: CLEAR
COLOR, POC: YELLOW
GLUCOSE URINE, POC: NORMAL MG/DL
KETONES, POC: NORMAL
LEUKOCYTE EST, POC: NORMAL
NITRITE, POC: NORMAL
PH, POC: NORMAL
PROTEIN, POC: NORMAL MG/DL
PSA SERPL-MCNC: 1.19 NG/ML (ref 0–4)
SPECIFIC GRAVITY, POC: NORMAL
UROBILINOGEN, POC: NORMAL

## 2024-12-18 PROCEDURE — 81003 URINALYSIS AUTO W/O SCOPE: CPT | Performed by: SPECIALIST

## 2024-12-18 PROCEDURE — 99204 OFFICE O/P NEW MOD 45 MIN: CPT | Performed by: SPECIALIST

## 2024-12-18 PROCEDURE — 3077F SYST BP >= 140 MM HG: CPT | Performed by: SPECIALIST

## 2024-12-18 PROCEDURE — 3080F DIAST BP >= 90 MM HG: CPT | Performed by: SPECIALIST

## 2024-12-18 RX ORDER — TESTOSTERONE ENANTHATE 75 MG/.5ML
75 INJECTION SUBCUTANEOUS WEEKLY
Qty: 6 ADJUSTABLE DOSE PRE-FILLED PEN SYRINGE | Refills: 1 | Status: SHIPPED | OUTPATIENT
Start: 2024-12-18 | End: 2025-01-23

## 2024-12-18 NOTE — PROGRESS NOTES
Puma Hernandez MD [facility] Urology Consultation / New Patient Visit    Patient:  Berny Hoyos  YOB: 1985  Date: 12/18/2024    Patient seen at the request of Richelle Burton MD  for purpose of evaluation of male hypogonadism.    HISTORY OF PRESENT ILLNESS:   The patient is a 39 y.o. male who presents today for evaluation of the following problems:   Hypogonadism:  Patient is here today for symptoms of low testosterone which started several month(s) ago.    The patient's last testosterone level was:  Lab Results   Component Value Date    TESTOSTERONE 227 (L) 11/08/2024     Current medical Rx for hypogonadism: none  Patient has the following symptoms consistent with symptomatic low testosterone:  Reduced sex drive: Yes  Reduced erectile function: Yes  Loss of body hair: No  Less beard growth: No  Loss of lean muscle mass: Yes  Feeling very tired all the time (fatigue): Yes  Hot flashes: No  Obesity (being overweight): Yes  Symptoms of depression: Yes   Lower urinary tract symptoms: nocturia, 1 times per night and feeling of incomplete bladder emptying   Today's AUA Symptom Score (QOL): 2 (0)  (Patient's old records have been requested, reviewed and summarized in today's note: 12/4/24 office note of Richelle Burton MD)    Summary of old records:   Male hypogonadism: wants to try Xyosted (testosterone enanthate) 75 mg/0.5 mL auto-injector subQ weekly OR Testosterone cypionate IM injections, 200 mg every 2 weeks      Procedures Today:   We discussed the various testosterone replacement therapy options including IM Depotestosterone injections, Xyosted (testosterone enanthate)  mg/0.5 mL auto-injector subQ weekly, testosterone gels and patches, and Jatenzo 237 mg po bid with food.      Discussed risk of testosterone replacement therapy including acne, hair growth, aggressive personality changes, polycythemia, testicular atrophy, worsening sleep apnea, breast enlargement,

## 2024-12-19 LAB
ESTRADIOL LEVEL: 29.6 PG/ML (ref 27–52)
LH SERPL-ACNC: 5.6 MIU/ML (ref 1.7–8.6)
PROLACTIN SERPL-MCNC: 6.23 NG/ML (ref 4.04–15.2)

## 2024-12-30 ENCOUNTER — OFFICE VISIT (OUTPATIENT)
Dept: PRIMARY CARE CLINIC | Age: 39
End: 2024-12-30
Payer: COMMERCIAL

## 2024-12-30 VITALS
WEIGHT: 291 LBS | OXYGEN SATURATION: 96 % | SYSTOLIC BLOOD PRESSURE: 142 MMHG | HEIGHT: 71 IN | DIASTOLIC BLOOD PRESSURE: 98 MMHG | HEART RATE: 92 BPM | BODY MASS INDEX: 40.74 KG/M2

## 2024-12-30 DIAGNOSIS — I10 PRIMARY HYPERTENSION: ICD-10-CM

## 2024-12-30 DIAGNOSIS — E66.01 CLASS 3 SEVERE OBESITY DUE TO EXCESS CALORIES WITH SERIOUS COMORBIDITY AND BODY MASS INDEX (BMI) OF 40.0 TO 44.9 IN ADULT: ICD-10-CM

## 2024-12-30 DIAGNOSIS — R60.0 LOWER EXTREMITY EDEMA: ICD-10-CM

## 2024-12-30 DIAGNOSIS — E11.9 TYPE 2 DIABETES MELLITUS WITHOUT COMPLICATION, WITHOUT LONG-TERM CURRENT USE OF INSULIN (HCC): Primary | ICD-10-CM

## 2024-12-30 DIAGNOSIS — E66.813 CLASS 3 SEVERE OBESITY DUE TO EXCESS CALORIES WITH SERIOUS COMORBIDITY AND BODY MASS INDEX (BMI) OF 40.0 TO 44.9 IN ADULT: ICD-10-CM

## 2024-12-30 DIAGNOSIS — Z78.9 NEVER SMOKED TOBACCO: ICD-10-CM

## 2024-12-30 PROCEDURE — 3077F SYST BP >= 140 MM HG: CPT | Performed by: STUDENT IN AN ORGANIZED HEALTH CARE EDUCATION/TRAINING PROGRAM

## 2024-12-30 PROCEDURE — 3080F DIAST BP >= 90 MM HG: CPT | Performed by: STUDENT IN AN ORGANIZED HEALTH CARE EDUCATION/TRAINING PROGRAM

## 2024-12-30 PROCEDURE — 99214 OFFICE O/P EST MOD 30 MIN: CPT | Performed by: STUDENT IN AN ORGANIZED HEALTH CARE EDUCATION/TRAINING PROGRAM

## 2024-12-30 RX ORDER — HYDROCHLOROTHIAZIDE 50 MG/1
50 TABLET ORAL DAILY
Qty: 30 TABLET | Refills: 0 | Status: SHIPPED | OUTPATIENT
Start: 2024-12-30

## 2024-12-30 SDOH — ECONOMIC STABILITY: FOOD INSECURITY: WITHIN THE PAST 12 MONTHS, THE FOOD YOU BOUGHT JUST DIDN'T LAST AND YOU DIDN'T HAVE MONEY TO GET MORE.: NEVER TRUE

## 2024-12-30 SDOH — ECONOMIC STABILITY: FOOD INSECURITY: WITHIN THE PAST 12 MONTHS, YOU WORRIED THAT YOUR FOOD WOULD RUN OUT BEFORE YOU GOT MONEY TO BUY MORE.: NEVER TRUE

## 2024-12-30 SDOH — ECONOMIC STABILITY: INCOME INSECURITY: HOW HARD IS IT FOR YOU TO PAY FOR THE VERY BASICS LIKE FOOD, HOUSING, MEDICAL CARE, AND HEATING?: NOT HARD AT ALL

## 2024-12-30 NOTE — PROGRESS NOTES
methods cannot be   used interchangeably.      Prolactin 12/18/2024 6.23  4.04 - 15.2 ng/mL Final    Comment: The presence of macroprolactin may cause interference in female patients with various   endocrinological diseases or during pregnancy.      LH 12/18/2024 5.6  1.7 - 8.6 mIU/mL Final    Comment: Reference Range:  Male:                        1.7-8.6  Ovulating Female:    Follicular Phase           2.4-12.6    Ovulation Phase           14.0-95.6    Luteal Phase               1.0-11.4  Postmenopausal Female:       7.7-58.5     Office Visit on 12/18/2024   Component Date Value Ref Range Status    Color, UA 12/18/2024 yellow   Final    Clarity, UA 12/18/2024 clear   Final    Glucose, UA POC 12/18/2024 neg  mg/dL Final    Blood, UA POC 12/18/2024 trace   Final    Protein, UA POC 12/18/2024 30 mg  mg/dL Final    Leukocytes, UA 12/18/2024 neg   Final    Nitrite, UA 12/18/2024 neg   Final   Hospital Outpatient Visit on 11/08/2024   Component Date Value Ref Range Status    Testosterone 11/08/2024 227 (L)  249 - 836 ng/dL Final   Admission on 10/18/2024, Discharged on 10/19/2024   Component Date Value Ref Range Status    POC Glucose 10/18/2024 102  75 - 110 mg/dL Final    POC Glucose 10/18/2024 147 (H)  75 - 110 mg/dL Final    POC Glucose 10/18/2024 170 (H)  75 - 110 mg/dL Final    Sodium 10/19/2024 136  136 - 145 mmol/L Final    Potassium 10/19/2024 3.8  3.7 - 5.3 mmol/L Final    SPECIMEN SLIGHTLY HEMOLYZED, RESULTS MAY BE ADVERSELY AFFECTED.    Chloride 10/19/2024 101  98 - 107 mmol/L Final    CO2 10/19/2024 24  20 - 31 mmol/L Final    Anion Gap 10/19/2024 11  9 - 16 mmol/L Final    Glucose 10/19/2024 198 (H)  74 - 99 mg/dL Final    BUN 10/19/2024 17  6 - 20 mg/dL Final    Creatinine 10/19/2024 0.9  0.70 - 1.20 mg/dL Final    Est, Glom Filt Rate 10/19/2024 >90  >60 mL/min/1.73m2 Final    Comment:       These results are not intended for use in patients <18 years of age.        eGFR results are calculated without a

## 2024-12-31 ENCOUNTER — TELEPHONE (OUTPATIENT)
Dept: NEUROSURGERY | Age: 39
End: 2024-12-31

## 2024-12-31 NOTE — TELEPHONE ENCOUNTER
Patient had surgery in October 2024. His estimated date to return to work was scheduled for January 10th 2025. Patient is requesting for an extension to be off a little longer. He states he was moving around more and noticed a sharp pain in his neck. He was last seen on December 9th 2024. Next visit 2/17/2025.

## 2025-01-07 NOTE — PROGRESS NOTES
[facility]  [facility]     Diabetes Management - Education/Consultation per physician referral     [address] [place] [address]  Phone: [phone]  Fax: [phone]    Discussed medication mechanism of action, the difference between Type 1 Diabetes and Type 2 Diabetes, \"My Plate\", other medications for diabetes (ex: SGLT2i), side effects of current medication, and A1C values.     Patient provided with \"Living with Diabetes\" booklet and \"Diabetes Survival Guide\" booklet. Patient also provided with Medication Management phone number for any further questions.      Patient completed diabetes education session with clinical pharmacist.    Will close referral at this time.       Jesusita Cueto, SadiaD, [facility]

## 2025-01-08 ENCOUNTER — PHARMACY VISIT (OUTPATIENT)
Age: 40
End: 2025-01-08
Payer: COMMERCIAL

## 2025-01-08 DIAGNOSIS — E11.9 TYPE 2 DIABETES MELLITUS WITHOUT COMPLICATION, WITHOUT LONG-TERM CURRENT USE OF INSULIN (HCC): Primary | ICD-10-CM

## 2025-01-08 PROCEDURE — 99212 OFFICE O/P EST SF 10 MIN: CPT

## 2025-01-13 ENCOUNTER — TELEPHONE (OUTPATIENT)
Dept: PRIMARY CARE CLINIC | Age: 40
End: 2025-01-13

## 2025-01-13 DIAGNOSIS — T88.7XXA MEDICATION SIDE EFFECT: ICD-10-CM

## 2025-01-13 DIAGNOSIS — E11.9 TYPE 2 DIABETES MELLITUS WITHOUT COMPLICATION, WITHOUT LONG-TERM CURRENT USE OF INSULIN (HCC): Primary | ICD-10-CM

## 2025-01-13 DIAGNOSIS — I10 PRIMARY HYPERTENSION: ICD-10-CM

## 2025-01-13 NOTE — TELEPHONE ENCOUNTER
Patient was notified will  Mounjaro tomorrow. Patient also stated that he was told to get labs done after 3rd shot of mounjaro. Did not see any orders in chart

## 2025-01-13 NOTE — TELEPHONE ENCOUNTER
Ashley was thrown away by his children. They cleaned the fridge out this weekend not knowing what the medication was. Patient would like a call if anything can ne done to help him

## 2025-01-13 NOTE — TELEPHONE ENCOUNTER
Okay to give 1 sample box. Pt is probably already doing this, but make sure box is labeled as important so this doesn't happen again.

## 2025-01-13 NOTE — TELEPHONE ENCOUNTER
Nothing in note about repeat bloodwork, but BMP ordered to ensure dose increase of HCTZ is being tolerated.

## 2025-01-13 NOTE — ASSESSMENT & PLAN NOTE
- Etiology uncertain, unlikely due to metformin or hydrochlorothiazide.  - Referral to dermatology for further evaluation. Request records from dermatology appointment on 05/30/2024.  - Discontinue metformin due to potential allergic reaction.

## 2025-01-13 NOTE — ASSESSMENT & PLAN NOTE
- A1c 7.1, poorly controlled. Reports weight gain despite dietary changes and reduced caloric intake. Less physically active due to recent surgery, currently recovering.  - Referral for diabetic education at [facility]. Advise ophthalmologist consultation for hypertension and diabetes.  - Initiate Mounjaro 2.5 mg weekly.

## 2025-01-16 ENCOUNTER — HOSPITAL ENCOUNTER (OUTPATIENT)
Age: 40
Setting detail: SPECIMEN
Discharge: HOME | End: 2025-01-16

## 2025-01-16 DIAGNOSIS — T88.7XXA MEDICATION SIDE EFFECT: ICD-10-CM

## 2025-01-16 DIAGNOSIS — I10 PRIMARY HYPERTENSION: ICD-10-CM

## 2025-01-16 LAB
ANION GAP SERPL CALCULATED.3IONS-SCNC: 12 MMOL/L (ref 9–16)
BUN SERPL-MCNC: 13 MG/DL (ref 6–20)
CALCIUM SERPL-MCNC: 10.1 MG/DL (ref 8.6–10.4)
CHLORIDE SERPL-SCNC: 98 MMOL/L (ref 98–107)
CO2 SERPL-SCNC: 28 MMOL/L (ref 20–31)
CREAT SERPL-MCNC: 1.2 MG/DL (ref 0.7–1.2)
GFR, ESTIMATED: 78 ML/MIN/1.73M2
GLUCOSE SERPL-MCNC: 121 MG/DL (ref 74–99)
POTASSIUM SERPL-SCNC: 3.8 MMOL/L (ref 3.7–5.3)
SODIUM SERPL-SCNC: 138 MMOL/L (ref 136–145)

## 2025-01-28 ENCOUNTER — OFFICE VISIT (OUTPATIENT)
Dept: PRIMARY CARE CLINIC | Age: 40
End: 2025-01-28
Payer: COMMERCIAL

## 2025-01-28 VITALS
DIASTOLIC BLOOD PRESSURE: 88 MMHG | BODY MASS INDEX: 38.08 KG/M2 | WEIGHT: 272 LBS | HEART RATE: 88 BPM | HEIGHT: 71 IN | SYSTOLIC BLOOD PRESSURE: 126 MMHG | OXYGEN SATURATION: 96 %

## 2025-01-28 DIAGNOSIS — E11.9 TYPE 2 DIABETES MELLITUS WITHOUT COMPLICATION, WITHOUT LONG-TERM CURRENT USE OF INSULIN (HCC): ICD-10-CM

## 2025-01-28 DIAGNOSIS — Z78.9 NEVER SMOKED TOBACCO: ICD-10-CM

## 2025-01-28 DIAGNOSIS — T46.5X5S: ICD-10-CM

## 2025-01-28 DIAGNOSIS — I10 PRIMARY HYPERTENSION: Primary | ICD-10-CM

## 2025-01-28 DIAGNOSIS — E66.01 CLASS 2 SEVERE OBESITY DUE TO EXCESS CALORIES WITH SERIOUS COMORBIDITY AND BODY MASS INDEX (BMI) OF 37.0 TO 37.9 IN ADULT: ICD-10-CM

## 2025-01-28 DIAGNOSIS — R60.0 LOWER EXTREMITY EDEMA: ICD-10-CM

## 2025-01-28 DIAGNOSIS — E66.812 CLASS 2 SEVERE OBESITY DUE TO EXCESS CALORIES WITH SERIOUS COMORBIDITY AND BODY MASS INDEX (BMI) OF 37.0 TO 37.9 IN ADULT: ICD-10-CM

## 2025-01-28 LAB — HBA1C MFR BLD: 6.3 %

## 2025-01-28 PROCEDURE — 3044F HG A1C LEVEL LT 7.0%: CPT | Performed by: STUDENT IN AN ORGANIZED HEALTH CARE EDUCATION/TRAINING PROGRAM

## 2025-01-28 PROCEDURE — 83036 HEMOGLOBIN GLYCOSYLATED A1C: CPT | Performed by: STUDENT IN AN ORGANIZED HEALTH CARE EDUCATION/TRAINING PROGRAM

## 2025-01-28 PROCEDURE — 99214 OFFICE O/P EST MOD 30 MIN: CPT | Performed by: STUDENT IN AN ORGANIZED HEALTH CARE EDUCATION/TRAINING PROGRAM

## 2025-01-28 PROCEDURE — 3074F SYST BP LT 130 MM HG: CPT | Performed by: STUDENT IN AN ORGANIZED HEALTH CARE EDUCATION/TRAINING PROGRAM

## 2025-01-28 PROCEDURE — 3079F DIAST BP 80-89 MM HG: CPT | Performed by: STUDENT IN AN ORGANIZED HEALTH CARE EDUCATION/TRAINING PROGRAM

## 2025-01-28 RX ORDER — HYDROCHLOROTHIAZIDE 50 MG/1
50 TABLET ORAL DAILY
Qty: 90 TABLET | Refills: 1 | Status: SHIPPED | OUTPATIENT
Start: 2025-01-28

## 2025-01-28 ASSESSMENT — PATIENT HEALTH QUESTIONNAIRE - PHQ9
1. LITTLE INTEREST OR PLEASURE IN DOING THINGS: NOT AT ALL
SUM OF ALL RESPONSES TO PHQ9 QUESTIONS 1 & 2: 0
2. FEELING DOWN, DEPRESSED OR HOPELESS: NOT AT ALL
SUM OF ALL RESPONSES TO PHQ QUESTIONS 1-9: 0

## 2025-01-28 NOTE — ASSESSMENT & PLAN NOTE
- BP improved, likely due to dietary modifications and weight loss  - Discussed potential side effects of calcium channel blockers, including erythema nodosum  - Updated medication allergy section  - Sent prescription for hydrochlorothiazide, 90 tablets with one refill

## 2025-01-28 NOTE — PROGRESS NOTES
[facility]  [address] [place] [address].  [place] [address]  Phone: [phone]  Fax: [phone]    Office Visit Note    Date of Visit: 2025   Patient Name: Berny Hoyos   Patient :  1985     ASSESSMENT/PLAN   1. Primary hypertension  Overview:  10/10/24 Echo: EF 65%. Normal LV wall motion and diastolic function w/ mod increased wall thickness.  Previously on hctz, lisinopril (erythema nodosum), amlodipine (erythema nodosum), and lopressor (erythema nodosum). Currently on HCTZ, managed by PCP.  Assessment & Plan:  - BP improved, likely due to dietary modifications and weight loss  - Discussed potential side effects of calcium channel blockers, including erythema nodosum  - Updated medication allergy section  - Sent prescription for hydrochlorothiazide, 90 tablets with one refill  Orders:  -     hydroCHLOROthiazide (HYDRODIURIL) 50 MG tablet; Take 1 tablet by mouth daily, Disp-90 tablet, R-1Normal  2. Type 2 diabetes mellitus without complication, without long-term current use of insulin (HCC)  Overview:  Started on Metformin, managed by PCP.  Assessment & Plan:  - Tolerating current medication well without significant side effects  - poc A1c ordered today  - If glucose levels well-controlled, no need to increase dosage  - Consider higher dose for additional weight loss benefits if desired  Hemoglobin A1C   Date Value Ref Range Status   2025 6.3 % Final     Orders:  -     POCT glycosylated hemoglobin (Hb A1C)  -     Tirzepatide 5 MG/0.5ML SOAJ; Inject 5 mg into the skin every 7 days, Disp-2 mL, R-0Normal  3. Lower extremity edema  Overview:  Intermittent issue for pt.  Assessment & Plan:  - improved since stopping amlodipine and lopressor  4. Adverse effect of antihypertensive, sequela  Comments:  - pt appears to react to amlodipine and lopressor with erythema nodosum  - drug allergies list updated to include comment about EN  5. Never smoked tobacco  6. Class 2 severe obesity due to

## 2025-01-28 NOTE — ASSESSMENT & PLAN NOTE
- Tolerating current medication well without significant side effects  - poc A1c ordered today  - If glucose levels well-controlled, no need to increase dosage  - Consider higher dose for additional weight loss benefits if desired  Hemoglobin A1C   Date Value Ref Range Status   01/28/2025 6.3 % Final

## 2025-02-04 ENCOUNTER — TELEPHONE (OUTPATIENT)
Dept: PRIMARY CARE CLINIC | Age: 40
End: 2025-02-04

## 2025-02-04 NOTE — TELEPHONE ENCOUNTER
Patient called and wanted to inform the provider that he will no longer be taking the medication Tirzepatide 5 MG/0.5ML SOAJ. Patient stated for 2 weeks he has had a major reaction with severe N/D within 24 Hrs of taking the injection.   Caller would like further recommendation / treatment for weight loss and keep A1C low.   Please advice.

## 2025-02-06 NOTE — TELEPHONE ENCOUNTER
There are a few different routes we could go from here, so pt should schedule an appt (can be virtual, since we're talking adjusting the treatment plan before a full month has passed) to discuss different options and start a new med.

## 2025-02-10 ENCOUNTER — OFFICE VISIT (OUTPATIENT)
Dept: PRIMARY CARE CLINIC | Age: 40
End: 2025-02-10
Payer: COMMERCIAL

## 2025-02-10 VITALS
WEIGHT: 271 LBS | BODY MASS INDEX: 37.94 KG/M2 | SYSTOLIC BLOOD PRESSURE: 120 MMHG | OXYGEN SATURATION: 97 % | HEIGHT: 71 IN | DIASTOLIC BLOOD PRESSURE: 94 MMHG | HEART RATE: 80 BPM

## 2025-02-10 DIAGNOSIS — I10 PRIMARY HYPERTENSION: ICD-10-CM

## 2025-02-10 DIAGNOSIS — E11.9 TYPE 2 DIABETES MELLITUS WITHOUT COMPLICATION, WITHOUT LONG-TERM CURRENT USE OF INSULIN (HCC): Primary | ICD-10-CM

## 2025-02-10 DIAGNOSIS — E66.01 CLASS 2 SEVERE OBESITY DUE TO EXCESS CALORIES WITH SERIOUS COMORBIDITY AND BODY MASS INDEX (BMI) OF 37.0 TO 37.9 IN ADULT: ICD-10-CM

## 2025-02-10 DIAGNOSIS — Z78.9 NEVER SMOKED TOBACCO: ICD-10-CM

## 2025-02-10 DIAGNOSIS — E66.812 CLASS 2 SEVERE OBESITY DUE TO EXCESS CALORIES WITH SERIOUS COMORBIDITY AND BODY MASS INDEX (BMI) OF 37.0 TO 37.9 IN ADULT: ICD-10-CM

## 2025-02-10 PROCEDURE — 99214 OFFICE O/P EST MOD 30 MIN: CPT | Performed by: STUDENT IN AN ORGANIZED HEALTH CARE EDUCATION/TRAINING PROGRAM

## 2025-02-10 PROCEDURE — 3044F HG A1C LEVEL LT 7.0%: CPT | Performed by: STUDENT IN AN ORGANIZED HEALTH CARE EDUCATION/TRAINING PROGRAM

## 2025-02-10 PROCEDURE — 3080F DIAST BP >= 90 MM HG: CPT | Performed by: STUDENT IN AN ORGANIZED HEALTH CARE EDUCATION/TRAINING PROGRAM

## 2025-02-10 PROCEDURE — 3074F SYST BP LT 130 MM HG: CPT | Performed by: STUDENT IN AN ORGANIZED HEALTH CARE EDUCATION/TRAINING PROGRAM

## 2025-02-10 NOTE — PROGRESS NOTES
[facility]  [address] [place] [address].  [place] [address]  Phone: [phone]  Fax: [phone]    Office Visit Note    Date of Visit: 2/10/2025   Patient Name: Berny Hoyos   Patient :  1985     ASSESSMENT/PLAN     1. Type 2 diabetes mellitus without complication, without long-term current use of insulin (HCC)  Overview:  Started on Metformin, managed by PCP.  Assessment & Plan:  - Symptoms suggest gastroparesis  - Current medication regimen may be too potent  - Initiate Jardiance 10 mg daily in the morning for blood glucose control, weight management, and blood pressure regulation  - Monitor for side effects (lightheadedness, dizziness) due to concurrent hydrochlorothiazide 50 mg  - Monitor for UTI symptoms and report promptly  - Diabetic foot exam next visit  - Follow-up A1c in 3 months to assess potential increase in Jardiance to 25 mg  Orders:  -     empagliflozin (JARDIANCE) 10 MG tablet; Take 1 tablet by mouth daily, Disp-90 tablet, R-0Normal  2. Primary hypertension  Overview:  10/10/24 Echo: EF 65%. Normal LV wall motion and diastolic function w/ mod increased wall thickness.  Previously on hctz, lisinopril (erythema nodosum), amlodipine (erythema nodosum), and lopressor (erythema nodosum). Currently on HCTZ, managed by PCP.  Assessment & Plan:  - Monitor blood pressure three times a week for 2 weeks while starting Jardiance  - Send blood pressure log via [employer]  - If consistently low readings, consider reducing hydrochlorothiazide dosage  3. Never smoked tobacco  4. Class 2 severe obesity due to excess calories with serious comorbidity and body mass index (BMI) of 37.0 to 37.9 in adult      Patient Instructions   Check your blood pressure three times a week for the next two weeks. Once you finish that, send me a picture of your blood pressure log so I can make sure your blood pressure has not dropped from taking two diuretics.     You can start taking the bondi pure two weeks after

## 2025-02-10 NOTE — PATIENT INSTRUCTIONS
Check your blood pressure three times a week for the next two weeks. Once you finish that, send me a picture of your blood pressure log so I can make sure your blood pressure has not dropped from taking two diuretics.     You can start taking the bondi pure two weeks after you start the Jardiance.

## 2025-02-17 ENCOUNTER — HOSPITAL ENCOUNTER (OUTPATIENT)
Age: 40
Discharge: HOME | End: 2025-02-19
Payer: COMMERCIAL

## 2025-02-17 ENCOUNTER — OFFICE VISIT (OUTPATIENT)
Dept: NEUROSURGERY | Age: 40
End: 2025-02-17
Payer: COMMERCIAL

## 2025-02-17 ENCOUNTER — HOSPITAL ENCOUNTER (OUTPATIENT)
Dept: GENERAL RADIOLOGY | Age: 40
Discharge: HOME | End: 2025-02-19
Payer: COMMERCIAL

## 2025-02-17 VITALS
HEIGHT: 71 IN | DIASTOLIC BLOOD PRESSURE: 92 MMHG | WEIGHT: 266 LBS | OXYGEN SATURATION: 97 % | HEART RATE: 93 BPM | SYSTOLIC BLOOD PRESSURE: 135 MMHG | BODY MASS INDEX: 37.24 KG/M2

## 2025-02-17 DIAGNOSIS — M47.22 CERVICAL SPONDYLOSIS WITH RADICULOPATHY: ICD-10-CM

## 2025-02-17 DIAGNOSIS — M47.22 CERVICAL SPONDYLOSIS WITH RADICULOPATHY: Primary | ICD-10-CM

## 2025-02-17 PROCEDURE — 3075F SYST BP GE 130 - 139MM HG: CPT | Performed by: NEUROLOGICAL SURGERY

## 2025-02-17 PROCEDURE — 72040 X-RAY EXAM NECK SPINE 2-3 VW: CPT

## 2025-02-17 PROCEDURE — 3080F DIAST BP >= 90 MM HG: CPT | Performed by: NEUROLOGICAL SURGERY

## 2025-02-17 PROCEDURE — 99213 OFFICE O/P EST LOW 20 MIN: CPT | Performed by: NEUROLOGICAL SURGERY

## 2025-02-17 NOTE — PROGRESS NOTES
[place] [address] [facility] [address]  [address]OUND FLOOR, [address]  [facility] [address]  Dept: [phone]    Patient:  Berny Hoyos  YOB: 1985  Date: 2/17/25    The patient is a 39 y.o. male who presents today for consult of the following problems:     Chief Complaint   Patient presents with    Follow-up     8 wks follow up with xrays             HPI:     Berny Hoyos is a 39 y.o. male on whom neurosurgical consultation was requested by Richelle Burton MD for management of Right upper extremity radiculopathy.  Has had complete resolution of right upper extremity symptoms as well as return of complete sensation in the thumb as of the last few weeks as well as the middle finger.  Still with some abnormal sensation deep in the right index finger.  Is continually working with therapy..      History:     Past Medical History:   Diagnosis Date    Cervical spondylolysis 2024    Chronic infection of both ears     Class 3 severe obesity due to excess calories in adult 09/25/2024    COVID 09/2021    Sore throat, cough, ear congestion,night chills, severe headache    Diverticulitis 04/30/2024    ER visit    Dyslipidemia     Hypertension     Low testosterone     Lumbar strain 10/2019    Prediabetes 09/25/2024    Snores 10/04/2024    Under care of team     PCP - Dr. Burton - last visit 9/25/2024    Under care of team     Neurosurgery - Dr. Monzon - last visit     Past Surgical History:   Procedure Laterality Date    APPENDECTOMY      ARTHROPLASTY Right 10/18/2024    CERVICAL DISC ARTHROPLASTY Right 10/18/2024    C6-C7 ARTHROPLASTY performed by Tabitha Adkins DO at [facility] OR    TYMPANOSTOMY TUBE PLACEMENT      WISDOM TOOTH EXTRACTION       Family History   Problem Relation Age of Onset    Heart Disease Father     High Blood Pressure Father     Heart Attack Father 56    Heart Disease Paternal Grandfather     Heart Attack

## 2025-03-01 NOTE — ASSESSMENT & PLAN NOTE
- Symptoms suggest gastroparesis  - Current medication regimen may be too potent  - Initiate Jardiance 10 mg daily in the morning for blood glucose control, weight management, and blood pressure regulation  - Monitor for side effects (lightheadedness, dizziness) due to concurrent hydrochlorothiazide 50 mg  - Monitor for UTI symptoms and report promptly  - Diabetic foot exam next visit  - Follow-up A1c in 3 months to assess potential increase in Jardiance to 25 mg

## 2025-03-01 NOTE — ASSESSMENT & PLAN NOTE
- Monitor blood pressure three times a week for 2 weeks while starting Jardiance  - Send blood pressure log via [employer]  - If consistently low readings, consider reducing hydrochlorothiazide dosage

## 2025-03-10 ENCOUNTER — TELEPHONE (OUTPATIENT)
Dept: PRIMARY CARE CLINIC | Age: 40
End: 2025-03-10

## 2025-04-17 ENCOUNTER — TELEPHONE (OUTPATIENT)
Age: 40
End: 2025-04-17

## 2025-04-17 DIAGNOSIS — R79.89 LOW TESTOSTERONE IN MALE: Primary | ICD-10-CM

## 2025-04-17 DIAGNOSIS — D75.1 ERYTHROCYTOSIS: ICD-10-CM

## 2025-04-17 NOTE — TELEPHONE ENCOUNTER
Patient called in to see if he should get his testosterone levels drawn, states he has not heard anything from office but remembers you saying he should get drawn a month or so after starting therapy. He is using xyosted weekly and has been for around 2 months now. Takes his injection tomorrow and would like to get levels tomorrow morning if possible. Placed order for FT Testosterone level, please advise if anything additional needed and if he gets level drawn prior to his injection tomorrow.

## 2025-04-18 ENCOUNTER — HOSPITAL ENCOUNTER (OUTPATIENT)
Age: 40
Setting detail: SPECIMEN
Discharge: HOME | End: 2025-04-18

## 2025-04-18 DIAGNOSIS — R79.89 LOW TESTOSTERONE IN MALE: ICD-10-CM

## 2025-04-18 DIAGNOSIS — D75.1 ERYTHROCYTOSIS: ICD-10-CM

## 2025-04-18 LAB
HCT VFR BLD AUTO: 54.2 % (ref 40.7–50.3)
HGB BLD-MCNC: 17.5 G/DL (ref 13–17)
SHBG SERPL-SCNC: 23 NMOL/L (ref 10–60)
TESTOST FREE MFR SERPL: 84.4 PG/ML (ref 47–244)
TESTOST SERPL-MCNC: 348 NG/DL (ref 249–836)

## 2025-04-21 ENCOUNTER — RESULTS FOLLOW-UP (OUTPATIENT)
Age: 40
End: 2025-04-21

## 2025-04-21 DIAGNOSIS — R79.89 LOW TESTOSTERONE IN MALE: Primary | ICD-10-CM

## 2025-04-25 ENCOUNTER — OFFICE VISIT (OUTPATIENT)
Dept: PRIMARY CARE CLINIC | Age: 40
End: 2025-04-25
Payer: COMMERCIAL

## 2025-04-25 ENCOUNTER — TELEPHONE (OUTPATIENT)
Dept: PRIMARY CARE CLINIC | Age: 40
End: 2025-04-25

## 2025-04-25 ENCOUNTER — HOSPITAL ENCOUNTER (OUTPATIENT)
Dept: VASCULAR LAB | Age: 40
Discharge: HOME | End: 2025-04-27
Attending: STUDENT IN AN ORGANIZED HEALTH CARE EDUCATION/TRAINING PROGRAM
Payer: COMMERCIAL

## 2025-04-25 VITALS
HEART RATE: 82 BPM | OXYGEN SATURATION: 96 % | BODY MASS INDEX: 36.4 KG/M2 | WEIGHT: 260 LBS | SYSTOLIC BLOOD PRESSURE: 138 MMHG | HEIGHT: 71 IN | DIASTOLIC BLOOD PRESSURE: 92 MMHG

## 2025-04-25 DIAGNOSIS — R23.8 LOCALIZED WARMTH OF SKIN: ICD-10-CM

## 2025-04-25 DIAGNOSIS — M79.89 SWELLING OF RIGHT LOWER EXTREMITY: Primary | ICD-10-CM

## 2025-04-25 DIAGNOSIS — R79.89 LOW TESTOSTERONE IN MALE: ICD-10-CM

## 2025-04-25 DIAGNOSIS — L53.9 LOCALIZED ERYTHEMA: ICD-10-CM

## 2025-04-25 DIAGNOSIS — M79.89 SWELLING OF RIGHT LOWER EXTREMITY: ICD-10-CM

## 2025-04-25 DIAGNOSIS — I10 PRIMARY HYPERTENSION: ICD-10-CM

## 2025-04-25 DIAGNOSIS — E11.9 TYPE 2 DIABETES MELLITUS WITHOUT COMPLICATION, WITHOUT LONG-TERM CURRENT USE OF INSULIN (HCC): ICD-10-CM

## 2025-04-25 PROCEDURE — 3075F SYST BP GE 130 - 139MM HG: CPT | Performed by: STUDENT IN AN ORGANIZED HEALTH CARE EDUCATION/TRAINING PROGRAM

## 2025-04-25 PROCEDURE — 3080F DIAST BP >= 90 MM HG: CPT | Performed by: STUDENT IN AN ORGANIZED HEALTH CARE EDUCATION/TRAINING PROGRAM

## 2025-04-25 PROCEDURE — 93971 EXTREMITY STUDY: CPT

## 2025-04-25 PROCEDURE — 3044F HG A1C LEVEL LT 7.0%: CPT | Performed by: STUDENT IN AN ORGANIZED HEALTH CARE EDUCATION/TRAINING PROGRAM

## 2025-04-25 PROCEDURE — 99213 OFFICE O/P EST LOW 20 MIN: CPT | Performed by: STUDENT IN AN ORGANIZED HEALTH CARE EDUCATION/TRAINING PROGRAM

## 2025-04-25 RX ORDER — HYDROCHLOROTHIAZIDE 50 MG/1
50 TABLET ORAL DAILY
Qty: 90 TABLET | Refills: 1 | Status: SHIPPED | OUTPATIENT
Start: 2025-04-25

## 2025-04-25 NOTE — PROGRESS NOTES
Dermatitis     \"Boils on my feet.\"       Patient Active Problem List   Diagnosis    Primary hypertension    Type 2 diabetes mellitus without complication, without long-term current use of insulin (HCC)    Dyslipidemia    Chronic sinusitis    Cervical stenosis of spinal canal    Lower extremity edema    Never smoked tobacco    Cervical spondylosis with radiculopathy    Low testosterone in male    Nocturia    BPH without obstruction/lower urinary tract symptoms       Past Medical History:   Diagnosis Date    Cervical spondylolysis 2024    Chronic infection of both ears     Class 3 severe obesity due to excess calories in adult (HCC) 09/25/2024    COVID 09/2021    Sore throat, cough, ear congestion,night chills, severe headache    Diverticulitis 04/30/2024    ER visit    Dyslipidemia     Hypertension     Low testosterone     Lumbar strain 10/2019    Prediabetes 09/25/2024    Snores 10/04/2024    Under care of team     PCP - Dr. Burton - last visit 9/25/2024    Under care of team     Neurosurgery - Dr. Monzon - last visit       Past Surgical History:   Procedure Laterality Date    APPENDECTOMY      ARTHROPLASTY Right 10/18/2024    CERVICAL DISC ARTHROPLASTY Right 10/18/2024    C6-C7 ARTHROPLASTY performed by Tabitha Adkins DO at [facility] OR    TYMPANOSTOMY TUBE PLACEMENT      WISDOM TOOTH EXTRACTION          Social History     Socioeconomic History    Marital status: Single     Spouse name: None    Number of children: None    Years of education: None    Highest education level: None   Tobacco Use    Smoking status: Never    Smokeless tobacco: Never   Vaping Use    Vaping status: Never Used   Substance and Sexual Activity    Alcohol use: Yes     Comment: 10    Drug use: Never     Comment: No    Sexual activity: Yes     Partners: Female     Social Drivers of Health     Financial Resource Strain: Low Risk  (12/30/2024)    Overall Financial Resource Strain (CARDIA)     Difficulty of Paying Living Expenses: Not hard at all

## 2025-04-26 ENCOUNTER — TELEPHONE (OUTPATIENT)
Dept: PRIMARY CARE CLINIC | Age: 40
End: 2025-04-26

## 2025-04-26 ENCOUNTER — RESULTS FOLLOW-UP (OUTPATIENT)
Dept: PRIMARY CARE CLINIC | Age: 40
End: 2025-04-26

## 2025-04-26 DIAGNOSIS — I10 PRIMARY HYPERTENSION: ICD-10-CM

## 2025-04-26 DIAGNOSIS — R23.8 LOCALIZED WARMTH OF SKIN: ICD-10-CM

## 2025-04-26 DIAGNOSIS — L53.9 LOCALIZED ERYTHEMA: ICD-10-CM

## 2025-04-26 DIAGNOSIS — E11.9 TYPE 2 DIABETES MELLITUS WITHOUT COMPLICATION, WITHOUT LONG-TERM CURRENT USE OF INSULIN (HCC): ICD-10-CM

## 2025-04-26 DIAGNOSIS — M79.89 SWELLING OF RIGHT LOWER EXTREMITY: Primary | ICD-10-CM

## 2025-04-26 LAB — ECHO BSA: 2.43 M2

## 2025-04-26 PROCEDURE — 93971 EXTREMITY STUDY: CPT | Performed by: SURGERY

## 2025-04-26 NOTE — TELEPHONE ENCOUNTER
Waiting on final doppler read- called pt and advised that Eliquis prescription sent to pharmacy- pt to start Eliquis while waiting on read. If no DVT found, pt would receive a call advising he can stop Eliquis. Also advised he could call the office after hours and speak with on-call physician for any questions, but that he should have a low threshold for going to ED for immediate re-evaluation.    Pt voiced understanding, had no further questions.

## 2025-04-26 NOTE — TELEPHONE ENCOUNTER
1634: Called pt, verified .   Patient is aware of negative Doppler.  Advised patient further workup (lab and stat XR) has been ordered to r/o other etiologies for erythema/warmth/swelling.    Pt is out of town, said he would get tests/imaging done tomorrow. Had picked up Eliquis and taken one dose- was advised to stop taking med. Pt reports pain has improved slightly, but has noticed extension in erythema. Again advised pt to have low threshold for ED visit, otherwise get labs and imaging done ASAP.    Patient again denied trauma to the area, no travel preceding pain/swelling. No systemic sx, but because of DM2, XR ordered to r/o nec fasc/soft tissue infx.    Pt still noting difficulty with ambulation- will provide letter to be excused from work from - at upcoming appt. Pt voiced understanding, had no questions.

## 2025-04-27 ENCOUNTER — HOSPITAL ENCOUNTER (OUTPATIENT)
Dept: GENERAL RADIOLOGY | Age: 40
Discharge: HOME | End: 2025-04-29
Payer: COMMERCIAL

## 2025-04-27 ENCOUNTER — HOSPITAL ENCOUNTER (OUTPATIENT)
Age: 40
Discharge: HOME | End: 2025-04-29
Payer: COMMERCIAL

## 2025-04-27 ENCOUNTER — HOSPITAL ENCOUNTER (OUTPATIENT)
Age: 40
Discharge: HOME | End: 2025-04-27
Payer: COMMERCIAL

## 2025-04-27 DIAGNOSIS — E11.9 TYPE 2 DIABETES MELLITUS WITHOUT COMPLICATION, WITHOUT LONG-TERM CURRENT USE OF INSULIN (HCC): ICD-10-CM

## 2025-04-27 DIAGNOSIS — M79.89 SWELLING OF RIGHT LOWER EXTREMITY: ICD-10-CM

## 2025-04-27 DIAGNOSIS — R23.8 LOCALIZED WARMTH OF SKIN: ICD-10-CM

## 2025-04-27 DIAGNOSIS — L53.9 LOCALIZED ERYTHEMA: ICD-10-CM

## 2025-04-27 DIAGNOSIS — I10 PRIMARY HYPERTENSION: ICD-10-CM

## 2025-04-27 LAB
ALBUMIN SERPL-MCNC: 4.4 G/DL (ref 3.5–5.2)
ALBUMIN/GLOB SERPL: 1.5 {RATIO} (ref 1–2.5)
ALP SERPL-CCNC: 66 U/L (ref 40–129)
ALT SERPL-CCNC: 39 U/L (ref 10–50)
ANION GAP SERPL CALCULATED.3IONS-SCNC: 15 MMOL/L (ref 9–16)
AST SERPL-CCNC: 30 U/L (ref 10–50)
BASOPHILS # BLD: 0.1 K/UL (ref 0–0.2)
BASOPHILS NFR BLD: 1 % (ref 0–2)
BILIRUB SERPL-MCNC: 0.2 MG/DL (ref 0–1.2)
BUN SERPL-MCNC: 12 MG/DL (ref 6–20)
CALCIUM SERPL-MCNC: 9.8 MG/DL (ref 8.6–10.4)
CHLORIDE SERPL-SCNC: 101 MMOL/L (ref 98–107)
CO2 SERPL-SCNC: 24 MMOL/L (ref 20–31)
CREAT SERPL-MCNC: 1 MG/DL (ref 0.7–1.2)
CRP SERPL HS-MCNC: 3.6 MG/L (ref 0–5)
D DIMER PPP FEU-MCNC: 0.33 UG/ML FEU (ref 0–0.59)
EOSINOPHIL # BLD: 0.64 K/UL (ref 0–0.44)
EOSINOPHILS RELATIVE PERCENT: 6 % (ref 1–4)
ERYTHROCYTE [DISTWIDTH] IN BLOOD BY AUTOMATED COUNT: 13.9 % (ref 11.8–14.4)
ERYTHROCYTE [SEDIMENTATION RATE] IN BLOOD BY PHOTOMETRIC METHOD: 18 MM/HR (ref 0–15)
GFR, ESTIMATED: >90 ML/MIN/1.73M2
GLUCOSE SERPL-MCNC: 124 MG/DL (ref 74–99)
HCT VFR BLD AUTO: 50.6 % (ref 40.7–50.3)
HGB BLD-MCNC: 16.4 G/DL (ref 13–17)
IMM GRANULOCYTES # BLD AUTO: <0.03 K/UL (ref 0–0.3)
IMM GRANULOCYTES NFR BLD: 0 %
LYMPHOCYTES NFR BLD: 2.77 K/UL (ref 1.1–3.7)
LYMPHOCYTES RELATIVE PERCENT: 27 % (ref 24–43)
MCH RBC QN AUTO: 30 PG (ref 25.2–33.5)
MCHC RBC AUTO-ENTMCNC: 32.4 G/DL (ref 28.4–34.8)
MCV RBC AUTO: 92.7 FL (ref 82.6–102.9)
MONOCYTES NFR BLD: 0.74 K/UL (ref 0.1–1.2)
MONOCYTES NFR BLD: 7 % (ref 3–12)
NEUTROPHILS NFR BLD: 59 % (ref 36–65)
NEUTS SEG NFR BLD: 6.19 K/UL (ref 1.5–8.1)
NRBC BLD-RTO: 0 PER 100 WBC
PLATELET # BLD AUTO: 283 K/UL (ref 138–453)
PMV BLD AUTO: 10.1 FL (ref 8.1–13.5)
POTASSIUM SERPL-SCNC: 4 MMOL/L (ref 3.7–5.3)
PROT SERPL-MCNC: 7.4 G/DL (ref 6.6–8.7)
RBC # BLD AUTO: 5.46 M/UL (ref 4.21–5.77)
SODIUM SERPL-SCNC: 140 MMOL/L (ref 136–145)
WBC OTHER # BLD: 10.5 K/UL (ref 3.5–11.3)

## 2025-04-27 PROCEDURE — 85025 COMPLETE CBC W/AUTO DIFF WBC: CPT

## 2025-04-27 PROCEDURE — 86140 C-REACTIVE PROTEIN: CPT

## 2025-04-27 PROCEDURE — 85652 RBC SED RATE AUTOMATED: CPT

## 2025-04-27 PROCEDURE — 80053 COMPREHEN METABOLIC PANEL: CPT

## 2025-04-27 PROCEDURE — 85379 FIBRIN DEGRADATION QUANT: CPT

## 2025-04-27 PROCEDURE — 73590 X-RAY EXAM OF LOWER LEG: CPT

## 2025-04-27 PROCEDURE — 36415 COLL VENOUS BLD VENIPUNCTURE: CPT

## 2025-04-28 ENCOUNTER — TELEPHONE (OUTPATIENT)
Dept: PRIMARY CARE CLINIC | Age: 40
End: 2025-04-28

## 2025-04-28 NOTE — TELEPHONE ENCOUNTER
Pt would like to know the results from the labs that he had done yesterday. He has concerns about his WBC. I explained that the provider has not seen the results yet, but someone from the office will contact him once the provider notates.

## 2025-04-29 ENCOUNTER — OFFICE VISIT (OUTPATIENT)
Dept: PRIMARY CARE CLINIC | Age: 40
End: 2025-04-29
Payer: COMMERCIAL

## 2025-04-29 VITALS
WEIGHT: 257 LBS | HEART RATE: 100 BPM | DIASTOLIC BLOOD PRESSURE: 92 MMHG | BODY MASS INDEX: 35.98 KG/M2 | OXYGEN SATURATION: 95 % | HEIGHT: 71 IN | SYSTOLIC BLOOD PRESSURE: 132 MMHG

## 2025-04-29 DIAGNOSIS — L53.9 LOCALIZED ERYTHEMA: Primary | ICD-10-CM

## 2025-04-29 DIAGNOSIS — M79.89 SWELLING OF RIGHT LOWER EXTREMITY: ICD-10-CM

## 2025-04-29 DIAGNOSIS — E66.09 CLASS 1 OBESITY DUE TO EXCESS CALORIES WITH SERIOUS COMORBIDITY AND BODY MASS INDEX (BMI) OF 33.0 TO 33.9 IN ADULT: ICD-10-CM

## 2025-04-29 DIAGNOSIS — E66.811 CLASS 1 OBESITY DUE TO EXCESS CALORIES WITH SERIOUS COMORBIDITY AND BODY MASS INDEX (BMI) OF 33.0 TO 33.9 IN ADULT: ICD-10-CM

## 2025-04-29 DIAGNOSIS — E11.9 TYPE 2 DIABETES MELLITUS WITHOUT COMPLICATION, WITHOUT LONG-TERM CURRENT USE OF INSULIN (HCC): ICD-10-CM

## 2025-04-29 DIAGNOSIS — Z78.9 NEVER SMOKED TOBACCO: ICD-10-CM

## 2025-04-29 DIAGNOSIS — R23.8 LOCALIZED WARMTH OF SKIN: ICD-10-CM

## 2025-04-29 PROCEDURE — 3080F DIAST BP >= 90 MM HG: CPT | Performed by: STUDENT IN AN ORGANIZED HEALTH CARE EDUCATION/TRAINING PROGRAM

## 2025-04-29 PROCEDURE — 99214 OFFICE O/P EST MOD 30 MIN: CPT | Performed by: STUDENT IN AN ORGANIZED HEALTH CARE EDUCATION/TRAINING PROGRAM

## 2025-04-29 PROCEDURE — 3075F SYST BP GE 130 - 139MM HG: CPT | Performed by: STUDENT IN AN ORGANIZED HEALTH CARE EDUCATION/TRAINING PROGRAM

## 2025-04-29 PROCEDURE — 3044F HG A1C LEVEL LT 7.0%: CPT | Performed by: STUDENT IN AN ORGANIZED HEALTH CARE EDUCATION/TRAINING PROGRAM

## 2025-04-29 RX ORDER — CEPHALEXIN 500 MG/1
500 CAPSULE ORAL 4 TIMES DAILY
Qty: 28 CAPSULE | Refills: 0 | Status: SHIPPED | OUTPATIENT
Start: 2025-04-29 | End: 2025-05-06

## 2025-04-29 NOTE — PROGRESS NOTES
[facility]  [address] [place] [address].  [place] [address]  Phone: [phone]  Fax: [phone]    Office Visit Note    Date of Visit: 2025   Patient Name: Berny Hoyos   Patient :  1985     ASSESSMENT/PLAN     1. Localized erythema  -     cephALEXin (KEFLEX) 500 MG capsule; Take 1 capsule by mouth 4 times daily for 7 days, Disp-28 capsule, R-0Normal  2. Localized warmth of skin  -     cephALEXin (KEFLEX) 500 MG capsule; Take 1 capsule by mouth 4 times daily for 7 days, Disp-28 capsule, R-0Normal  3. Swelling of right lower extremity  Assessment & Plan:  - normal CBC and BMP. D-dimer also wnl  - XR showed no significant findings. Minimal change to swelling/erythema from last appt  - pt concerned for cellulitis. Clinical presentation does not align with cellulitis, but pt's PMH does increase risk of cellulitic infx and complications from infections overall  - discussed risks vs benefits of abx use. Pt voiced understanding, would like to proceed with abx. Prescribed Keflex   - For pain management, alternate Tylenol and ibuprofen every 6 hours, ensuring one medication is taken every 3 hours. Discontinue ibuprofen once pain subsides and continue Tylenol.  - Expected to return to work on 2025.  - If no improvement by the third day, inform for extended note and further investigation.  Orders:  -     cephALEXin (KEFLEX) 500 MG capsule; Take 1 capsule by mouth 4 times daily for 7 days, Disp-28 capsule, R-0Normal  4. Type 2 diabetes mellitus without complication, without long-term current use of insulin (HCC)  Overview:  Previously on Mounjaro (unable to tolerate PO intake, N/V).  Currently on metformin and Jardiance, managed by PCP.  5. Never smoked tobacco  6. Class 1 obesity due to excess calories with serious comorbidity and body mass index (BMI) of 33.0 to 33.9 in adult      There are no Patient Instructions on file for this visit.     Return if symptoms worsen or fail to

## 2025-04-30 DIAGNOSIS — R79.89 LOW TESTOSTERONE IN MALE: ICD-10-CM

## 2025-05-02 RX ORDER — TESTOSTERONE ENANTHATE 75 MG/.5ML
INJECTION SUBCUTANEOUS
Qty: 6 ADJUSTABLE DOSE PRE-FILLED PEN SYRINGE | Refills: 0 | OUTPATIENT
Start: 2025-05-02

## 2025-05-06 ENCOUNTER — HOSPITAL ENCOUNTER (OUTPATIENT)
Age: 40
Setting detail: SPECIMEN
Discharge: HOME | End: 2025-05-06

## 2025-05-06 DIAGNOSIS — R79.89 LOW TESTOSTERONE IN MALE: ICD-10-CM

## 2025-05-06 LAB
HCT VFR BLD AUTO: 52.2 % (ref 40.7–50.3)
HGB BLD-MCNC: 17.4 G/DL (ref 13–17)

## 2025-05-10 PROBLEM — M79.89 SWELLING OF RIGHT LOWER EXTREMITY: Status: ACTIVE | Noted: 2025-05-10

## 2025-05-10 NOTE — ASSESSMENT & PLAN NOTE
- normal CBC and BMP. D-dimer also wnl  - XR showed no significant findings. Minimal change to swelling/erythema from last appt  - pt concerned for cellulitis. Clinical presentation does not align with cellulitis, but pt's PMH does increase risk of cellulitic infx and complications from infections overall  - discussed risks vs benefits of abx use. Pt voiced understanding, would like to proceed with abx. Prescribed Keflex   - For pain management, alternate Tylenol and ibuprofen every 6 hours, ensuring one medication is taken every 3 hours. Discontinue ibuprofen once pain subsides and continue Tylenol.  - Expected to return to work on 05/05/2025.  - If no improvement by the third day, inform for extended note and further investigation.

## 2025-05-28 ENCOUNTER — OFFICE VISIT (OUTPATIENT)
Age: 40
End: 2025-05-28
Payer: COMMERCIAL

## 2025-05-28 VITALS — HEIGHT: 71 IN | BODY MASS INDEX: 35.98 KG/M2 | WEIGHT: 257 LBS

## 2025-05-28 DIAGNOSIS — R35.1 NOCTURIA: ICD-10-CM

## 2025-05-28 DIAGNOSIS — D75.1 OTHER ERYTHROCYTOSIS: ICD-10-CM

## 2025-05-28 DIAGNOSIS — R79.89 LOW TESTOSTERONE IN MALE: Primary | ICD-10-CM

## 2025-05-28 DIAGNOSIS — N40.0 BPH WITHOUT OBSTRUCTION/LOWER URINARY TRACT SYMPTOMS: ICD-10-CM

## 2025-05-28 PROCEDURE — 99214 OFFICE O/P EST MOD 30 MIN: CPT | Performed by: SPECIALIST

## 2025-05-28 RX ORDER — TESTOSTERONE ENANTHATE 100 MG/.5ML
100 INJECTION SUBCUTANEOUS WEEKLY
Qty: 6 ADJUSTABLE DOSE PRE-FILLED PEN SYRINGE | Refills: 1 | Status: SHIPPED | OUTPATIENT
Start: 2025-05-28 | End: 2025-07-03

## 2025-05-28 RX ORDER — APIXABAN 5 MG/1
TABLET, FILM COATED ORAL
COMMUNITY
Start: 2025-04-26

## 2025-05-28 NOTE — PROGRESS NOTES
Puma Hernandez MD FACS    [facility]rology Office Progress Note    Patient:  Berny Hoyos  YOB: 1985  Date: 5/28/2025    HISTORY OF PRESENT ILLNESS:   The patient is a 39 y.o. male  Patient's free / total testosterone = 84/348 on Xyosted (testosterone enanthate) 75 mg/0.5 mL auto-injector subQ weekly.  Will increase to 100 mg dose and repeat serum free and total testosterone level the morning of his 5th Testosterone injection to establish adequate levels.    Patient has had testosterone replacement therapy induced erythrocytosis but he is donating blood at the [facility] every 3 months to minimize this.  Patient told he needs to keep hemoglobin and hematocrit below 18 and 54 respectively.   Lower urinary tract symptoms: urgency, frequency, nocturia, 1 times per night, and incomplete emptying    Last AUA Symptom Score (QOL): 2 (0)  Today's AUA Symptom Score (QOL): 4 (1)    Summary of old records:   Male hypogonadism: wants to try Xyosted (testosterone enanthate) 75 mg/0.5 mL auto-injector subQ weekly; increase to 100 mg 5/28/25; consider adding Arimidex  Testosterone replacement therapy induced erythrocytosis: donate blood at [facility]    Additional History: none    Procedures Today: N/A    Urinalysis today:  No results found for this visit on 05/28/25.    Last several PSA's:  Lab Results   Component Value Date    PSA 1.19 12/18/2024       Last total testosterone:  Lab Results   Component Value Date    TESTOSTERONE 348 04/18/2025       Last BUN and creatinine:  Lab Results   Component Value Date    BUN 12 04/27/2025     Lab Results   Component Value Date    CREATININE 1.0 04/27/2025       Last CBC:  Lab Results   Component Value Date    WBC 10.5 04/27/2025    HGB 17.4 (H) 05/06/2025    HCT 52.2 (H) 05/06/2025    MCV 92.7 04/27/2025     04/27/2025       Additional Lab/Culture results: none    Imaging Reviewed during this Office Visit: none  (results were independently

## 2025-08-25 ENCOUNTER — OFFICE VISIT (OUTPATIENT)
Dept: PRIMARY CARE CLINIC | Age: 40
End: 2025-08-25
Payer: COMMERCIAL

## 2025-08-25 VITALS
DIASTOLIC BLOOD PRESSURE: 88 MMHG | HEIGHT: 71 IN | OXYGEN SATURATION: 94 % | BODY MASS INDEX: 37.66 KG/M2 | SYSTOLIC BLOOD PRESSURE: 130 MMHG | HEART RATE: 96 BPM | WEIGHT: 269 LBS

## 2025-08-25 DIAGNOSIS — R60.0 LOWER EXTREMITY EDEMA: Primary | ICD-10-CM

## 2025-08-25 DIAGNOSIS — I10 PRIMARY HYPERTENSION: ICD-10-CM

## 2025-08-25 DIAGNOSIS — L52 ERYTHEMA NODOSUM: ICD-10-CM

## 2025-08-25 DIAGNOSIS — E11.9 TYPE 2 DIABETES MELLITUS WITHOUT COMPLICATION, WITHOUT LONG-TERM CURRENT USE OF INSULIN (HCC): ICD-10-CM

## 2025-08-25 PROCEDURE — 99214 OFFICE O/P EST MOD 30 MIN: CPT | Performed by: STUDENT IN AN ORGANIZED HEALTH CARE EDUCATION/TRAINING PROGRAM

## 2025-08-25 PROCEDURE — 3079F DIAST BP 80-89 MM HG: CPT | Performed by: STUDENT IN AN ORGANIZED HEALTH CARE EDUCATION/TRAINING PROGRAM

## 2025-08-25 PROCEDURE — 3044F HG A1C LEVEL LT 7.0%: CPT | Performed by: STUDENT IN AN ORGANIZED HEALTH CARE EDUCATION/TRAINING PROGRAM

## 2025-08-25 PROCEDURE — 3075F SYST BP GE 130 - 139MM HG: CPT | Performed by: STUDENT IN AN ORGANIZED HEALTH CARE EDUCATION/TRAINING PROGRAM

## 2025-08-25 RX ORDER — PREDNISONE 20 MG/1
20 TABLET ORAL DAILY
Qty: 7 TABLET | Refills: 0 | Status: SHIPPED | OUTPATIENT
Start: 2025-08-25 | End: 2025-09-01

## 2025-08-25 RX ORDER — FUROSEMIDE 20 MG/1
20 TABLET ORAL DAILY PRN
Qty: 30 TABLET | Refills: 0 | Status: SHIPPED | OUTPATIENT
Start: 2025-08-25

## 2025-08-25 RX ORDER — HYDROCHLOROTHIAZIDE 50 MG/1
50 TABLET ORAL DAILY
Qty: 90 TABLET | Refills: 1 | Status: CANCELLED | OUTPATIENT
Start: 2025-08-25

## 2025-08-26 DIAGNOSIS — E11.9 TYPE 2 DIABETES MELLITUS WITHOUT COMPLICATION, WITHOUT LONG-TERM CURRENT USE OF INSULIN (HCC): ICD-10-CM

## (undated) DEVICE — 3.0MM PRECISION NEURO (MATCH HEAD)

## (undated) DEVICE — SPONGE,PEANUT,XRAY,ST,SM,3/8",5/CARD: Brand: MEDLINE INDUSTRIES, INC.

## (undated) DEVICE — AGENT HEMOSTATIC SURGIFLOW MATRIX KIT W/THROMBIN

## (undated) DEVICE — APPLICATOR MEDICATED 10.5 CC SOLUTION HI LT ORNG CHLORAPREP

## (undated) DEVICE — DRAPE MICSCP W117XL305CM DIA68MM LENS W VARI LENS2 FOR LEICA

## (undated) DEVICE — GARMENT,MEDLINE,DVT,INT,CALF,MED, GEN2: Brand: MEDLINE

## (undated) DEVICE — GLOVE SURG SZ 75 CRM LTX FREE POLYISOPRENE POLYMER BEAD ANTI

## (undated) DEVICE — BLADE,STAINLESS-STEEL,15,STRL,DISPOSABLE: Brand: MEDLINE

## (undated) DEVICE — MARKER,SKIN,WI/RULER AND LABELS: Brand: MEDLINE

## (undated) DEVICE — SYRINGE MED 10ML TRNSLUC BRL PLUNG BLK MRK POLYPR CTRL

## (undated) DEVICE — ULTRACLEAN ACCESSORY ELECTRODE 6" (15.24 CM) COATED BLADE: Brand: ULTRACLEAN

## (undated) DEVICE — SUTURE MONOCRYL SZ 4-0 L18IN ABSRB UD L16MM PC-3 3/8 CIR PRIM Y845G

## (undated) DEVICE — C-ARM: Brand: UNBRANDED

## (undated) DEVICE — PAD,NON-ADHERENT,3X8,STERILE,LF,1/PK: Brand: MEDLINE

## (undated) DEVICE — SUTURE VICRYL SZ 3-0 L18IN ABSRB UD L26MM SH 1/2 CIR J864D

## (undated) DEVICE — MITT SURG PREP L ADH DISPOSABLE

## (undated) DEVICE — BLADE CLP TAPR HD WET DRY CAPABILITY GTT IN CHARGING USE

## (undated) DEVICE — GAUZE,SPONGE,FLUFF,6"X6.75",STRL,5/TRAY: Brand: MEDLINE

## (undated) DEVICE — STRAP,POSITIONING,KNEE/BODY,FOAM,4X60": Brand: MEDLINE

## (undated) DEVICE — THE STERILE LIGHT HANDLE COVER IS USED WITH STERIS SURGICAL LIGHTING AND VISUALIZATION SYSTEMS.

## (undated) DEVICE — ELECTRODE PT RET AD L9FT HI MOIST COND ADH HYDRGEL CORDED

## (undated) DEVICE — DRILL BIT 6975150 FLUTELESS DRILL BIT

## (undated) DEVICE — STVZ LUMBAR SPINE PACK: Brand: MEDLINE INDUSTRIES, INC.

## (undated) DEVICE — SURGIFOAM SPNG SZ 100

## (undated) DEVICE — STERILE POLYISOPRENE POWDER-FREE SURGICAL GLOVES WITH EMOLLIENT COATING: Brand: PROTEXIS

## (undated) DEVICE — ABS MED DISTRACTION PIN, 14MM PATIENT (INNER): Brand: ABS MED DISTRACTION PIN

## (undated) DEVICE — NEEDLE, QUINCKE, 18GX3.5": Brand: MEDLINE

## (undated) DEVICE — BLADE ES ELASTOMERIC COAT INSUL DURABLE BEND UPTO 90DEG